# Patient Record
Sex: FEMALE | Race: WHITE
[De-identification: names, ages, dates, MRNs, and addresses within clinical notes are randomized per-mention and may not be internally consistent; named-entity substitution may affect disease eponyms.]

---

## 2019-11-25 ENCOUNTER — HOSPITAL ENCOUNTER (OUTPATIENT)
Dept: HOSPITAL 8 - OUT | Age: 39
Discharge: HOME | End: 2019-11-25
Attending: INTERNAL MEDICINE

## 2019-11-25 VITALS — BODY MASS INDEX: 40.56 KG/M2 | HEIGHT: 69 IN | WEIGHT: 273.81 LBS

## 2019-11-25 VITALS — SYSTOLIC BLOOD PRESSURE: 131 MMHG | DIASTOLIC BLOOD PRESSURE: 93 MMHG

## 2019-11-25 DIAGNOSIS — Z98.890: ICD-10-CM

## 2019-11-25 DIAGNOSIS — J45.909: ICD-10-CM

## 2019-11-25 DIAGNOSIS — Z79.01: ICD-10-CM

## 2019-11-25 DIAGNOSIS — R91.8: Primary | ICD-10-CM

## 2019-11-25 DIAGNOSIS — F15.90: ICD-10-CM

## 2019-11-25 DIAGNOSIS — G47.30: ICD-10-CM

## 2019-11-25 DIAGNOSIS — J20.9: ICD-10-CM

## 2019-11-25 LAB
ALBUMIN SERPL-MCNC: 3.3 G/DL (ref 3.4–5)
ALP SERPL-CCNC: 99 U/L (ref 45–117)
ALT SERPL-CCNC: 29 U/L (ref 12–78)
ANION GAP SERPL CALC-SCNC: 6 MMOL/L (ref 5–15)
APTT BLD: 29 SECONDS (ref 25–31)
BASOPHILS # BLD AUTO: 0.03 X10^3/UL (ref 0–0.1)
BASOPHILS NFR BLD AUTO: 1 % (ref 0–1)
BILIRUB SERPL-MCNC: 0.5 MG/DL (ref 0.2–1)
CALCIUM SERPL-MCNC: 8.9 MG/DL (ref 8.5–10.1)
CHLORIDE SERPL-SCNC: 107 MMOL/L (ref 98–107)
CREAT SERPL-MCNC: 0.87 MG/DL (ref 0.55–1.02)
EOSINOPHIL # BLD AUTO: 0.15 X10^3/UL (ref 0–0.4)
EOSINOPHIL NFR BLD AUTO: 2 % (ref 1–7)
ERYTHROCYTE [DISTWIDTH] IN BLOOD BY AUTOMATED COUNT: 14.1 % (ref 9.6–15.2)
HCG UR SG: 1.01 (ref 1–1.03)
INR PPP: 0.94 (ref 0.93–1.1)
LYMPHOCYTES # BLD AUTO: 1.44 X10^3/UL (ref 1–3.4)
LYMPHOCYTES NFR BLD AUTO: 22 % (ref 22–44)
MCH RBC QN AUTO: 26.7 PG (ref 27–34.8)
MCHC RBC AUTO-ENTMCNC: 33.4 G/DL (ref 32.4–35.8)
MCV RBC AUTO: 80 FL (ref 80–100)
MD: NO
MONOCYTES # BLD AUTO: 0.36 X10^3/UL (ref 0.2–0.8)
MONOCYTES NFR BLD AUTO: 6 % (ref 2–9)
NEUTROPHILS # BLD AUTO: 4.44 X10^3/UL (ref 1.8–6.8)
NEUTROPHILS NFR BLD AUTO: 69 % (ref 42–75)
PLATELET # BLD AUTO: 319 X10^3/UL (ref 130–400)
PMV BLD AUTO: 8.2 FL (ref 7.4–10.4)
PROT SERPL-MCNC: 7.6 G/DL (ref 6.4–8.2)
PROTHROMBIN TIME: 9.9 SECONDS (ref 9.6–11.5)
RBC # BLD AUTO: 4.63 X10^6/UL (ref 3.82–5.3)

## 2019-11-25 PROCEDURE — 88305 TISSUE EXAM BY PATHOLOGIST: CPT

## 2019-11-25 PROCEDURE — 85025 COMPLETE CBC W/AUTO DIFF WBC: CPT

## 2019-11-25 PROCEDURE — 99152 MOD SED SAME PHYS/QHP 5/>YRS: CPT

## 2019-11-25 PROCEDURE — 36415 COLL VENOUS BLD VENIPUNCTURE: CPT

## 2019-11-25 PROCEDURE — 85610 PROTHROMBIN TIME: CPT

## 2019-11-25 PROCEDURE — 81025 URINE PREGNANCY TEST: CPT

## 2019-11-25 PROCEDURE — 85730 THROMBOPLASTIN TIME PARTIAL: CPT

## 2019-11-25 PROCEDURE — 87070 CULTURE OTHR SPECIMN AEROBIC: CPT

## 2019-11-25 PROCEDURE — 88312 SPECIAL STAINS GROUP 1: CPT

## 2019-11-25 PROCEDURE — 87205 SMEAR GRAM STAIN: CPT

## 2019-11-25 PROCEDURE — 88112 CYTOPATH CELL ENHANCE TECH: CPT

## 2019-11-25 PROCEDURE — 87102 FUNGUS ISOLATION CULTURE: CPT

## 2019-11-25 PROCEDURE — 80053 COMPREHEN METABOLIC PANEL: CPT

## 2019-11-25 PROCEDURE — 87015 SPECIMEN INFECT AGNT CONCNTJ: CPT

## 2019-11-25 PROCEDURE — 31624 DX BRONCHOSCOPE/LAVAGE: CPT

## 2019-11-25 PROCEDURE — 87206 SMEAR FLUORESCENT/ACID STAI: CPT

## 2019-11-25 PROCEDURE — 99153 MOD SED SAME PHYS/QHP EA: CPT

## 2019-11-25 PROCEDURE — 87116 MYCOBACTERIA CULTURE: CPT

## 2019-11-25 PROCEDURE — 88108 CYTOPATH CONCENTRATE TECH: CPT

## 2019-12-16 ENCOUNTER — HOSPITAL ENCOUNTER (INPATIENT)
Dept: HOSPITAL 8 - ED | Age: 39
LOS: 2 days | Discharge: HOME | DRG: 167 | End: 2019-12-18
Attending: HOSPITALIST | Admitting: HOSPITALIST
Payer: COMMERCIAL

## 2019-12-16 VITALS — SYSTOLIC BLOOD PRESSURE: 114 MMHG | DIASTOLIC BLOOD PRESSURE: 73 MMHG

## 2019-12-16 VITALS — SYSTOLIC BLOOD PRESSURE: 122 MMHG | DIASTOLIC BLOOD PRESSURE: 66 MMHG

## 2019-12-16 VITALS — BODY MASS INDEX: 41.93 KG/M2 | WEIGHT: 283.07 LBS | HEIGHT: 69 IN

## 2019-12-16 DIAGNOSIS — Z83.3: ICD-10-CM

## 2019-12-16 DIAGNOSIS — K21.9: ICD-10-CM

## 2019-12-16 DIAGNOSIS — J45.909: ICD-10-CM

## 2019-12-16 DIAGNOSIS — R06.89: ICD-10-CM

## 2019-12-16 DIAGNOSIS — Z71.3: ICD-10-CM

## 2019-12-16 DIAGNOSIS — F32.9: ICD-10-CM

## 2019-12-16 DIAGNOSIS — E66.01: ICD-10-CM

## 2019-12-16 DIAGNOSIS — G47.30: ICD-10-CM

## 2019-12-16 DIAGNOSIS — I10: ICD-10-CM

## 2019-12-16 DIAGNOSIS — Z80.3: ICD-10-CM

## 2019-12-16 DIAGNOSIS — J18.9: Primary | ICD-10-CM

## 2019-12-16 DIAGNOSIS — Z79.52: ICD-10-CM

## 2019-12-16 LAB
<PLATELET ESTIMATE>: ADEQUATE
<PLT MORPHOLOGY>: (no result)
ALBUMIN SERPL-MCNC: 3.5 G/DL (ref 3.4–5)
ANION GAP SERPL CALC-SCNC: 6 MMOL/L (ref 5–15)
BAND#(MANUAL): 0.22 X10^3/UL
BILIRUB DIRECT SERPL-MCNC: NORMAL MG/DL
CALCIUM SERPL-MCNC: 9 MG/DL (ref 8.5–10.1)
CHLORIDE SERPL-SCNC: 106 MMOL/L (ref 98–107)
CREAT SERPL-MCNC: 0.91 MG/DL (ref 0.55–1.02)
ERYTHROCYTE [DISTWIDTH] IN BLOOD BY AUTOMATED COUNT: 15.2 % (ref 9.6–15.2)
ERYTHROCYTE [SEDIMENTATION RATE] IN BLOOD BY WESTERGREN METHOD: 28 MM/HR (ref 0–20)
HCT (SEDRATE): 41.3 % (ref 34.6–47.8)
LYMPH#(MANUAL): 3.23 X10^3/UL (ref 1–3.4)
LYMPHS% (MANUAL): 15 % (ref 22–44)
MCH RBC QN AUTO: 26.7 PG (ref 27–34.8)
MCHC RBC AUTO-ENTMCNC: 32.7 G/DL (ref 32.4–35.8)
MCV RBC AUTO: 81.4 FL (ref 80–100)
MD: YES
MONOS#(MANUAL): 0.65 X10^3/UL (ref 0.3–2.7)
MONOS% (MANUAL): 3 % (ref 2–9)
NEUTS BAND NFR BLD: 1 % (ref 0–7)
PLATELET # BLD AUTO: 435 X10^3/UL (ref 130–400)
PMV BLD AUTO: 8.2 FL (ref 7.4–10.4)
RBC # BLD AUTO: 5.07 X10^6/UL (ref 3.82–5.3)
SEG#(MANUAL): 17.42 X10^3/UL (ref 1.8–6.8)
SEGS% (MANUAL): 81 % (ref 42–75)

## 2019-12-16 PROCEDURE — C1729 CATH, DRAINAGE: HCPCS

## 2019-12-16 PROCEDURE — 0399T: CPT

## 2019-12-16 PROCEDURE — C1760 CLOSURE DEV, VASC: HCPCS

## 2019-12-16 PROCEDURE — 71045 X-RAY EXAM CHEST 1 VIEW: CPT

## 2019-12-16 PROCEDURE — 85025 COMPLETE CBC W/AUTO DIFF WBC: CPT

## 2019-12-16 PROCEDURE — 87075 CULTR BACTERIA EXCEPT BLOOD: CPT

## 2019-12-16 PROCEDURE — 87102 FUNGUS ISOLATION CULTURE: CPT

## 2019-12-16 PROCEDURE — 82040 ASSAY OF SERUM ALBUMIN: CPT

## 2019-12-16 PROCEDURE — 86140 C-REACTIVE PROTEIN: CPT

## 2019-12-16 PROCEDURE — 86256 FLUORESCENT ANTIBODY TITER: CPT

## 2019-12-16 PROCEDURE — 82785 ASSAY OF IGE: CPT

## 2019-12-16 PROCEDURE — 84145 PROCALCITONIN (PCT): CPT

## 2019-12-16 PROCEDURE — 80048 BASIC METABOLIC PNL TOTAL CA: CPT

## 2019-12-16 PROCEDURE — 82784 ASSAY IGA/IGD/IGG/IGM EACH: CPT

## 2019-12-16 PROCEDURE — 99285 EMERGENCY DEPT VISIT HI MDM: CPT

## 2019-12-16 PROCEDURE — 93005 ELECTROCARDIOGRAM TRACING: CPT

## 2019-12-16 PROCEDURE — 87070 CULTURE OTHR SPECIMN AEROBIC: CPT

## 2019-12-16 PROCEDURE — 87116 MYCOBACTERIA CULTURE: CPT

## 2019-12-16 PROCEDURE — 83880 ASSAY OF NATRIURETIC PEPTIDE: CPT

## 2019-12-16 PROCEDURE — 83605 ASSAY OF LACTIC ACID: CPT

## 2019-12-16 PROCEDURE — 86038 ANTINUCLEAR ANTIBODIES: CPT

## 2019-12-16 PROCEDURE — 93306 TTE W/DOPPLER COMPLETE: CPT

## 2019-12-16 PROCEDURE — 83520 IMMUNOASSAY QUANT NOS NONAB: CPT

## 2019-12-16 PROCEDURE — 87206 SMEAR FLUORESCENT/ACID STAI: CPT

## 2019-12-16 PROCEDURE — 87015 SPECIMEN INFECT AGNT CONCNTJ: CPT

## 2019-12-16 PROCEDURE — 85651 RBC SED RATE NONAUTOMATED: CPT

## 2019-12-16 PROCEDURE — 87040 BLOOD CULTURE FOR BACTERIA: CPT

## 2019-12-16 PROCEDURE — 71260 CT THORAX DX C+: CPT

## 2019-12-16 PROCEDURE — 84703 CHORIONIC GONADOTROPIN ASSAY: CPT

## 2019-12-16 PROCEDURE — 88307 TISSUE EXAM BY PATHOLOGIST: CPT

## 2019-12-16 PROCEDURE — 71046 X-RAY EXAM CHEST 2 VIEWS: CPT

## 2019-12-16 PROCEDURE — 87205 SMEAR GRAM STAIN: CPT

## 2019-12-16 PROCEDURE — 36415 COLL VENOUS BLD VENIPUNCTURE: CPT

## 2019-12-16 RX ADMIN — VENLAFAXINE HYDROCHLORIDE SCH MG: 37.5 CAPSULE, EXTENDED RELEASE ORAL at 20:10

## 2019-12-16 RX ADMIN — TOPIRAMATE SCH MG: 25 TABLET, FILM COATED ORAL at 20:10

## 2019-12-16 RX ADMIN — PANTOPRAZOLE SODIUM SCH MG: 40 TABLET, DELAYED RELEASE ORAL at 20:10

## 2019-12-16 RX ADMIN — AZITHROMYCIN FOR INJECTION INJECTION, POWDER, LYOPHILIZED, FOR SOLUTION SCH MLS/HR: 500 INJECTION INTRAVENOUS at 16:40

## 2019-12-16 RX ADMIN — BUDESONIDE AND FORMOTEROL FUMARATE DIHYDRATE SCH PUFF: 160; 4.5 AEROSOL RESPIRATORY (INHALATION) at 20:44

## 2019-12-16 RX ADMIN — ENOXAPARIN SODIUM SCH MG: 40 INJECTION SUBCUTANEOUS at 20:09

## 2019-12-16 RX ADMIN — MONTELUKAST SODIUM SCH MG: 10 TABLET ORAL at 20:10

## 2019-12-16 NOTE — NUR
PT SITTING UP IN BED, RESPIRATIONS EVEN AND UNLABORED SATURATING WELL ON RA. 
FREQUENT DRY COUGH, WEAK/HOARSE VOICE. PT REPORT SOB AND COUGH X7 MO WITH NO 
RESOLVE VIA PREDNISONE. SIDE RAIL UP, CALL LIGHT IN REACH. LABS DRAWN, BP AND 
SPO2 MONITORING IN PLACE. EKG COMPLETED. AWAITING [facility].

## 2019-12-16 NOTE — NUR
PT SITTING UP IN BED, RESPIRATIONS EVEN AND UNLABORED ON RA. OCCASSIONAL COUGH. 
IV START FOR CT SCAN. NAD NOTED AT THIS TIME. FRIEND AT BEDSIDE. CALL LIGHT IN 
REACH.

## 2019-12-16 NOTE — NUR
pt returned from ct, using cell phone in bed. NAD noted at this time. Friend at 
bedside. Awaiting results.

## 2019-12-16 NOTE — NUR
REPORT TO SUZY MARTINEZ. NAD NOTED IN PT AT THIS TIME. RESPIRATIONS EVEN AND 
UNLABORED, FREQUENT COUGH. AWAITING TRANSPORT UPSTAIRS. PT AWARE OF NEED FOR 
CPAP.

## 2019-12-17 VITALS — SYSTOLIC BLOOD PRESSURE: 118 MMHG | DIASTOLIC BLOOD PRESSURE: 74 MMHG

## 2019-12-17 VITALS — SYSTOLIC BLOOD PRESSURE: 122 MMHG | DIASTOLIC BLOOD PRESSURE: 81 MMHG

## 2019-12-17 VITALS — SYSTOLIC BLOOD PRESSURE: 133 MMHG | DIASTOLIC BLOOD PRESSURE: 86 MMHG

## 2019-12-17 VITALS — SYSTOLIC BLOOD PRESSURE: 122 MMHG | DIASTOLIC BLOOD PRESSURE: 79 MMHG

## 2019-12-17 LAB
ANA SER QL: (no result)
ANION GAP SERPL CALC-SCNC: 6 MMOL/L (ref 5–15)
BASOPHILS # BLD AUTO: 0.01 X10^3/UL (ref 0–0.1)
BASOPHILS NFR BLD AUTO: 0 % (ref 0–1)
CALCIUM SERPL-MCNC: 9 MG/DL (ref 8.5–10.1)
CHLORIDE SERPL-SCNC: 108 MMOL/L (ref 98–107)
CREAT SERPL-MCNC: 0.91 MG/DL (ref 0.55–1.02)
EOSINOPHIL # BLD AUTO: 0.2 X10^3/UL (ref 0–0.4)
EOSINOPHIL NFR BLD AUTO: 2 % (ref 1–7)
ERYTHROCYTE [DISTWIDTH] IN BLOOD BY AUTOMATED COUNT: 14.9 % (ref 9.6–15.2)
LYMPHOCYTES # BLD AUTO: 1.31 X10^3/UL (ref 1–3.4)
LYMPHOCYTES NFR BLD AUTO: 10 % (ref 22–44)
MCH RBC QN AUTO: 26.6 PG (ref 27–34.8)
MCHC RBC AUTO-ENTMCNC: 32.6 G/DL (ref 32.4–35.8)
MCV RBC AUTO: 81.8 FL (ref 80–100)
MD: NO
MONOCYTES # BLD AUTO: 0.23 X10^3/UL (ref 0.2–0.8)
MONOCYTES NFR BLD AUTO: 2 % (ref 2–9)
NEUTROPHILS # BLD AUTO: 11.7 X10^3/UL (ref 1.8–6.8)
NEUTROPHILS NFR BLD AUTO: 87 % (ref 42–75)
PLATELET # BLD AUTO: 386 X10^3/UL (ref 130–400)
PMV BLD AUTO: 8.8 FL (ref 7.4–10.4)
RBC # BLD AUTO: 4.76 X10^6/UL (ref 3.82–5.3)

## 2019-12-17 PROCEDURE — 0BBJ4ZX EXCISION OF LEFT LOWER LUNG LOBE, PERCUTANEOUS ENDOSCOPIC APPROACH, DIAGNOSTIC: ICD-10-PCS | Performed by: SURGERY

## 2019-12-17 PROCEDURE — 0BBG4ZX EXCISION OF LEFT UPPER LUNG LOBE, PERCUTANEOUS ENDOSCOPIC APPROACH, DIAGNOSTIC: ICD-10-PCS | Performed by: SURGERY

## 2019-12-17 RX ADMIN — BUDESONIDE AND FORMOTEROL FUMARATE DIHYDRATE SCH PUFF: 160; 4.5 AEROSOL RESPIRATORY (INHALATION) at 08:28

## 2019-12-17 RX ADMIN — MONTELUKAST SODIUM SCH MG: 10 TABLET ORAL at 11:03

## 2019-12-17 RX ADMIN — ACETAMINOPHEN SCH MG: 500 TABLET, COATED ORAL at 23:29

## 2019-12-17 RX ADMIN — TOPIRAMATE SCH MG: 25 TABLET, FILM COATED ORAL at 23:29

## 2019-12-17 RX ADMIN — VENLAFAXINE HYDROCHLORIDE SCH MG: 37.5 CAPSULE, EXTENDED RELEASE ORAL at 23:28

## 2019-12-17 RX ADMIN — AZITHROMYCIN FOR INJECTION INJECTION, POWDER, LYOPHILIZED, FOR SOLUTION SCH MLS/HR: 500 INJECTION INTRAVENOUS at 16:43

## 2019-12-17 RX ADMIN — OXYCODONE HYDROCHLORIDE PRN MG: 5 TABLET ORAL at 23:29

## 2019-12-17 RX ADMIN — ENOXAPARIN SODIUM SCH MG: 40 INJECTION SUBCUTANEOUS at 20:00

## 2019-12-17 RX ADMIN — BUDESONIDE AND FORMOTEROL FUMARATE DIHYDRATE SCH PUFF: 160; 4.5 AEROSOL RESPIRATORY (INHALATION) at 21:00

## 2019-12-17 RX ADMIN — PANTOPRAZOLE SODIUM SCH MG: 40 TABLET, DELAYED RELEASE ORAL at 11:03

## 2019-12-17 RX ADMIN — CEFTRIAXONE SCH MLS/HR: 1 INJECTION, SOLUTION INTRAVENOUS at 11:45

## 2019-12-18 VITALS — DIASTOLIC BLOOD PRESSURE: 77 MMHG | SYSTOLIC BLOOD PRESSURE: 126 MMHG

## 2019-12-18 VITALS — DIASTOLIC BLOOD PRESSURE: 88 MMHG | SYSTOLIC BLOOD PRESSURE: 130 MMHG

## 2019-12-18 VITALS — DIASTOLIC BLOOD PRESSURE: 82 MMHG | SYSTOLIC BLOOD PRESSURE: 125 MMHG

## 2019-12-18 VITALS — DIASTOLIC BLOOD PRESSURE: 74 MMHG | SYSTOLIC BLOOD PRESSURE: 133 MMHG

## 2019-12-18 RX ADMIN — IBUPROFEN SCH MG: 800 TABLET ORAL at 09:00

## 2019-12-18 RX ADMIN — CEFTRIAXONE SCH MLS/HR: 1 INJECTION, SOLUTION INTRAVENOUS at 12:12

## 2019-12-18 RX ADMIN — OXYCODONE HYDROCHLORIDE PRN MG: 5 TABLET ORAL at 05:43

## 2019-12-18 RX ADMIN — PANTOPRAZOLE SODIUM SCH MG: 40 TABLET, DELAYED RELEASE ORAL at 12:13

## 2019-12-18 RX ADMIN — MONTELUKAST SODIUM SCH MG: 10 TABLET ORAL at 12:13

## 2019-12-18 RX ADMIN — ACETAMINOPHEN SCH MG: 500 TABLET, COATED ORAL at 12:12

## 2019-12-18 RX ADMIN — IBUPROFEN SCH MG: 800 TABLET ORAL at 12:00

## 2019-12-18 RX ADMIN — ACETAMINOPHEN SCH MG: 500 TABLET, COATED ORAL at 05:42

## 2019-12-18 RX ADMIN — BUDESONIDE AND FORMOTEROL FUMARATE DIHYDRATE SCH PUFF: 160; 4.5 AEROSOL RESPIRATORY (INHALATION) at 09:00

## 2020-05-08 ENCOUNTER — HOSPITAL ENCOUNTER (OUTPATIENT)
Dept: HOSPITAL 8 - CFH | Age: 40
Discharge: HOME | End: 2020-05-08
Attending: GENERAL PRACTICE
Payer: COMMERCIAL

## 2020-05-08 DIAGNOSIS — R91.8: ICD-10-CM

## 2020-05-08 DIAGNOSIS — I51.7: ICD-10-CM

## 2020-05-08 DIAGNOSIS — J84.9: Primary | ICD-10-CM

## 2020-05-08 PROCEDURE — 71250 CT THORAX DX C-: CPT

## 2020-12-02 ASSESSMENT — ENCOUNTER SYMPTOMS
SHORTNESS OF BREATH: 1
HEMOPTYSIS: 0
CHEST TIGHTNESS: 1
RESPIRATORY SYMPTOMS COMMENTS: SOMETIMES
WHEEZING: 1
DYSPNEA AT REST: 1

## 2020-12-04 ENCOUNTER — OFFICE VISIT (OUTPATIENT)
Dept: SLEEP MEDICINE | Facility: MEDICAL CENTER | Age: 40
End: 2020-12-04
Payer: COMMERCIAL

## 2020-12-04 VITALS
OXYGEN SATURATION: 94 % | SYSTOLIC BLOOD PRESSURE: 108 MMHG | WEIGHT: 293 LBS | DIASTOLIC BLOOD PRESSURE: 72 MMHG | HEIGHT: 68 IN | HEART RATE: 82 BPM | BODY MASS INDEX: 44.41 KG/M2

## 2020-12-04 DIAGNOSIS — E66.01 CLASS 3 SEVERE OBESITY WITH SERIOUS COMORBIDITY AND BODY MASS INDEX (BMI) OF 45.0 TO 49.9 IN ADULT, UNSPECIFIED OBESITY TYPE (HCC): ICD-10-CM

## 2020-12-04 DIAGNOSIS — D63.8 ANEMIA OF CHRONIC DISEASE: ICD-10-CM

## 2020-12-04 DIAGNOSIS — J84.9 INTERSTITIAL LUNG DISEASE (HCC): ICD-10-CM

## 2020-12-04 DIAGNOSIS — G47.33 OSA ON CPAP: ICD-10-CM

## 2020-12-04 PROCEDURE — 99358 PROLONG SERVICE W/O CONTACT: CPT | Performed by: INTERNAL MEDICINE

## 2020-12-04 PROCEDURE — 99205 OFFICE O/P NEW HI 60 MIN: CPT | Performed by: INTERNAL MEDICINE

## 2020-12-04 RX ORDER — HYDROCODONE BITARTRATE AND ACETAMINOPHEN 7.5; 325 MG/1; MG/1
2 TABLET ORAL EVERY 6 HOURS PRN
COMMUNITY
Start: 2020-10-30

## 2020-12-04 RX ORDER — HYDROCHLOROTHIAZIDE 25 MG/1
25 TABLET ORAL DAILY
COMMUNITY
Start: 2020-11-25

## 2020-12-04 RX ORDER — IBUPROFEN 800 MG/1
800 TABLET ORAL 3 TIMES DAILY PRN
COMMUNITY
Start: 2020-11-11

## 2020-12-04 RX ORDER — METHOCARBAMOL 750 MG/1
750 TABLET, FILM COATED ORAL 3 TIMES DAILY PRN
COMMUNITY
Start: 2020-10-30

## 2020-12-04 RX ORDER — BUDESONIDE AND FORMOTEROL FUMARATE DIHYDRATE 80; 4.5 UG/1; UG/1
2 AEROSOL RESPIRATORY (INHALATION) 2 TIMES DAILY
COMMUNITY
Start: 2020-07-24 | End: 2021-06-16

## 2020-12-04 RX ORDER — VENLAFAXINE HYDROCHLORIDE 37.5 MG/1
75 CAPSULE, EXTENDED RELEASE ORAL DAILY
COMMUNITY
Start: 2020-11-24

## 2020-12-04 RX ORDER — PREDNISONE 2.5 MG/1
8 TABLET ORAL DAILY
COMMUNITY
Start: 2020-10-13

## 2020-12-04 RX ORDER — MYCOPHENOLATE MOFETIL 500 MG/1
1250 TABLET ORAL 2 TIMES DAILY
COMMUNITY
Start: 2020-11-11

## 2020-12-04 RX ORDER — TOPIRAMATE 25 MG/1
25 TABLET ORAL DAILY
COMMUNITY
Start: 2020-11-11 | End: 2021-06-16

## 2020-12-04 RX ORDER — OMEPRAZOLE 20 MG/1
20 CAPSULE, DELAYED RELEASE ORAL 2 TIMES DAILY
COMMUNITY
Start: 2020-11-24 | End: 2021-06-16 | Stop reason: SDUPTHER

## 2020-12-04 RX ORDER — MONTELUKAST SODIUM 10 MG/1
10 TABLET ORAL DAILY
COMMUNITY
Start: 2020-11-24 | End: 2021-06-16 | Stop reason: SDUPTHER

## 2020-12-04 RX ORDER — BENZONATATE 100 MG/1
100 CAPSULE ORAL 2 TIMES DAILY PRN
COMMUNITY
Start: 2020-09-29 | End: 2022-09-08 | Stop reason: SDUPTHER

## 2020-12-04 ASSESSMENT — ENCOUNTER SYMPTOMS
SHORTNESS OF BREATH: 1
WHEEZING: 1
HEMOPTYSIS: 0
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
## 2020-12-05 NOTE — ASSESSMENT & PLAN NOTE
Extensive prior work-up for interstitial lung disease at [facility] at [facility] including surgical lung biopsy in 2019, working diagnosis of NSIP/OP related to underlying undifferentiated connective tissue disease.  SSA positive, however lip bx negative for Sjogren's findings    No O2  mMRC 2/ NYHA II.    Plan:  - Sees [facility] pulmonologist Dr. Short 12/2020.  PFTs/6MWT ordered prior to appointment.  - Continue MMF 2 g/day, prednisone 7.5 mg p.o. daily.   - Although I think she would greatly benefit, pulmonary rehab is unfortunately not realistic as she has a full-time job Monday through Friday and has exhausted her vacation/sick leave.  - Requested recommendations for alternative rheumatologist, referral placed  - PCV13: 6/2020; influenza 9/2020

## 2020-12-05 NOTE — ASSESSMENT & PLAN NOTE
Counseled about the need for weight loss for long-term health risk reduction  If lung transplant is a necessity in the future will also need to get weight down

## 2020-12-05 NOTE — ASSESSMENT & PLAN NOTE
Labs 11/2020: chronic microcytic anemia, iron saturation 9%, iron and ferritin lower limit of normal.  TIBC, B12 and folate WNL.    -Overall pattern suggestive of combination anemia of chronic disease and SYMONE  -start iron supplementation OTC ferrous sulfate

## 2020-12-05 NOTE — ASSESSMENT & PLAN NOTE
Referral placed to establish care, previously followed at [facility]  Currently APAP 5-15 cmH2O

## 2020-12-07 ENCOUNTER — HOSPITAL ENCOUNTER (OUTPATIENT)
Dept: RADIOLOGY | Facility: MEDICAL CENTER | Age: 40
End: 2020-12-07
Payer: COMMERCIAL

## 2020-12-17 ENCOUNTER — APPOINTMENT (OUTPATIENT)
Dept: SLEEP MEDICINE | Facility: MEDICAL CENTER | Age: 40
End: 2020-12-17
Payer: COMMERCIAL

## 2021-01-21 ENCOUNTER — NON-PROVIDER VISIT (OUTPATIENT)
Dept: SLEEP MEDICINE | Facility: MEDICAL CENTER | Age: 41
End: 2021-01-21
Payer: COMMERCIAL

## 2021-01-21 ENCOUNTER — NON-PROVIDER VISIT (OUTPATIENT)
Dept: SLEEP MEDICINE | Facility: MEDICAL CENTER | Age: 41
End: 2021-01-21
Attending: INTERNAL MEDICINE
Payer: COMMERCIAL

## 2021-01-21 VITALS — WEIGHT: 293 LBS | BODY MASS INDEX: 44.41 KG/M2 | HEIGHT: 68 IN

## 2021-01-21 VITALS — BODY MASS INDEX: 44.41 KG/M2 | WEIGHT: 293 LBS | HEIGHT: 68 IN

## 2021-01-21 DIAGNOSIS — J84.9 INTERSTITIAL LUNG DISEASE (HCC): ICD-10-CM

## 2021-01-21 PROCEDURE — 94729 DIFFUSING CAPACITY: CPT | Performed by: INTERNAL MEDICINE

## 2021-01-21 PROCEDURE — 94618 PULMONARY STRESS TESTING: CPT | Performed by: INTERNAL MEDICINE

## 2021-01-21 PROCEDURE — 94060 EVALUATION OF WHEEZING: CPT | Performed by: INTERNAL MEDICINE

## 2021-01-21 PROCEDURE — 94726 PLETHYSMOGRAPHY LUNG VOLUMES: CPT | Performed by: INTERNAL MEDICINE

## 2021-01-21 ASSESSMENT — 6 MINUTE WALK TEST (6MWT)
PERCEIVED BREATHLESSNESS AT 6 MIN: 2
PERCEIVED FATIGUE AT 6 MIN: 4
HEART RATE AT 1 MIN: 110
SITTING BLOOD PRESSURE: 140/92
SAO2 AT 4 MIN: 88
PERCEIVED FATIGUE AT 2 MIN: 4
SAO2 AT 6 MIN: 88
BLOOD PRESSURE: LEFT ARM
SAO2 AT 1 MIN: 92
HEART RATE AT 4 MIN: 148
PERCEIVED FATIGUE AT 4 MIN: 4
HEART RATE AT 2 MIN: 100
PERCEIVED FATIGUE AT 3 MIN: 4
PERCEIVED BREATHLESSNESS AT 1 MIN: 0.5
PERCENT OF NORMAL WALKED: 83
BLOOD PRESSURE AT 1 MIN: 140/92
HEART RATE AT 2 MIN: 134
PERCEIVED BREATHLESSNESS AT 1 MIN: 2
HEART RATE AT 3 MIN: 136
HEART RATE AT 5 MIN: 152
HEART RATE: 84
HEART RATE AT 1 MIN: 136
SAO2 AT 5 MIN: 88
PERCEIVED BREATHLESSNESS AT 3 MIN: 0.5
NUMBER OF RESTS: 0
PERCEIVED FATIGUE AT 5 MIN: 4
SAO2 AT 1 MIN: 94
TOTAL REST TIME: 0
PERCEIVED FATIGUE AT 1 MIN: 4
HEART RATE AT 6 MIN: 159
PERCEIVED BREATHLESSNESS AT 5 MIN: 1
SAO2 AT 3 MIN: 90
SAO2 AT 2 MIN: 89
SAO2 AT 2 MIN: 96
PERCEIVED BREATHLESSNESS AT 2 MIN: 2
AMBULATES WITH O2: WITHOUT O2
O2 SAT PERCENT ROOM AIR: 97
PERCEIVED FATIGUE AT 1 MIN: 4
PERCEIVED BREATHLESSNESS AT 4 MIN: 1
PERCEIVED BREATHLESSNESS AT 2 MIN: 0.5
PERCEIVED FATIGUE AT 2 MIN: 4

## 2021-01-21 ASSESSMENT — PULMONARY FUNCTION TESTS
FVC_PREDICTED: 4.24
FEV1/FVC_PERCENT_PREDICTED: 82
FEV1_PERCENT_PREDICTED: 49
FVC_PERCENT_PREDICTED: 76
FEV1/FVC: 60
FEV1/FVC_PERCENT_PREDICTED: 73
FEV1_LLN: 2.89
FEV1/FVC_PERCENT_CHANGE: 22
FEV1_PERCENT_CHANGE: 38
FVC_LLN: 3.54
FEV1/FVC_PERCENT_PREDICTED: 89
FEV1/FVC_PERCENT_LLN: 69
FEV1/FVC: 73
FEV1_PERCENT_CHANGE: 13
FEV1/FVC: 60
FVC_PERCENT_PREDICTED: 67
FEV1/FVC_PERCENT_PREDICTED: 89
FEV1/FVC_PREDICTED: 82
FEV1: 2.38
FEV1_PERCENT_PREDICTED: 68
FEV1_PREDICTED: 3.46
FEV1/FVC_PERCENT_PREDICTED: 73
FEV1/FVC: 73.46
FVC: 2.85
FEV1/FVC_PERCENT_CHANGE: 292
FEV1: 1.72
FVC: 3.24

## 2021-01-21 NOTE — PROCEDURES
Technician: ANA Pinzon    Technician Comment:  Good patient effort & cooperation.  The results of this test meet the ATS/ERS standards for acceptability & reproducibility.  Test was performed on the Real Time Content Body Plethysmograph-Elite DX system.  Predicted values were GLI- for spirometry, GLI- for DLCO, ITS for Lung Volumes.  The DLCO was uncorrected for Hgb.  A bronchodilator of Ventolin HFA -2puffs via spacer administered.  DLCO performed during dilation period.    FVC of 3.24 L or 76%, FEV1 of 2.38 L or 68%, FEV1/FVC: 73%.  Significant reversibility noted.  T%.  DLCO: 102%.    Interpretation:  Moderately severe obstructive ventilatory defect, with significant bronchodilator response.  The best FEV1 is 2.38 L or 68%.  Normal total lung capacity.  Normal diffusion capacity.

## 2021-01-21 NOTE — PROCEDURES
Test on Room Air. Patient has pain in her back.    Interpretation;  There is significant desaturation with ambulation from a saturation of 97% at rest on room air to a malcom of 88% at 4 minutes.  Patient was able to maintain a saturation of 88% without dropping lower and therefore was not placed on supplemental oxygen for the test.  Distance walked was 1320 feet or 83% predicted.  This test does show abnormal desaturation with ambulation.  Patient may benefit from supplemental oxygen with activity.  Exercise tolerance is good.

## 2021-01-27 ENCOUNTER — PATIENT MESSAGE (OUTPATIENT)
Dept: SLEEP MEDICINE | Facility: MEDICAL CENTER | Age: 41
End: 2021-01-27

## 2021-02-26 ENCOUNTER — OFFICE VISIT (OUTPATIENT)
Dept: SLEEP MEDICINE | Facility: MEDICAL CENTER | Age: 41
End: 2021-02-26
Payer: COMMERCIAL

## 2021-02-26 VITALS
HEART RATE: 92 BPM | BODY MASS INDEX: 44.41 KG/M2 | HEIGHT: 68 IN | OXYGEN SATURATION: 93 % | WEIGHT: 293 LBS | DIASTOLIC BLOOD PRESSURE: 72 MMHG | SYSTOLIC BLOOD PRESSURE: 118 MMHG

## 2021-02-26 DIAGNOSIS — G47.33 OSA ON CPAP: ICD-10-CM

## 2021-02-26 DIAGNOSIS — J84.9 INTERSTITIAL LUNG DISEASE (HCC): ICD-10-CM

## 2021-02-26 DIAGNOSIS — E66.01 CLASS 3 SEVERE OBESITY WITH SERIOUS COMORBIDITY AND BODY MASS INDEX (BMI) OF 45.0 TO 49.9 IN ADULT, UNSPECIFIED OBESITY TYPE (HCC): ICD-10-CM

## 2021-02-26 DIAGNOSIS — D63.8 ANEMIA OF CHRONIC DISEASE: ICD-10-CM

## 2021-02-26 PROCEDURE — 99214 OFFICE O/P EST MOD 30 MIN: CPT | Performed by: INTERNAL MEDICINE

## 2021-02-26 PROCEDURE — 94761 N-INVAS EAR/PLS OXIMETRY MLT: CPT | Performed by: INTERNAL MEDICINE

## 2021-02-26 ASSESSMENT — ENCOUNTER SYMPTOMS
SINUS PAIN: 0
DIARRHEA: 0
WEIGHT LOSS: 0
VOMITING: 0
EYE DISCHARGE: 0
EYE PAIN: 0
WHEEZING: 1
FOCAL WEAKNESS: 0
LOSS OF CONSCIOUSNESS: 0
FEVER: 0
SORE THROAT: 0
NAUSEA: 0
MYALGIAS: 0
SHORTNESS OF BREATH: 1
ORTHOPNEA: 0
STRIDOR: 0
PSYCHIATRIC NEGATIVE: 1
HEMOPTYSIS: 0
SENSORY CHANGE: 0
DIZZINESS: 0
CHILLS: 0
HEADACHES: 0
SPUTUM PRODUCTION: 0
COUGH: 0
ABDOMINAL PAIN: 0

## 2021-02-26 NOTE — PROCEDURES
Multi-Ox Readings  Multi Ox #1 Room air   O2 sat % at rest 96(Pulse: 87)   O2 sat % on exertion 90(Pulse: 100)   O2 sat average on exertion     Multi Ox #2     O2 sat % at rest     O2 sat % on exertion     O2 sat average on exertion       Oxygen Use     Oxygen Frequency     Duration of need     Is the patient mobile within the home?     CPAP Use? 5-15   BIPAP Use?     Servo Titration

## 2021-02-26 NOTE — PROGRESS NOTES
Pulmonary Clinic Note    Chief Complaint:  Chief Complaint   Patient presents with   • Follow-Up     ILD. Last seen 12/04/20   • Results     PFT/6MW 01/21/21, [facility] 02/10/21     HPI:   Lara Alexander is a very pleasant 40 y.o. female never tobacco smoker who returns to the pulmonary clinic for follow-up of interstitial lung disease.  To the pulmonary clinic for interstitial lung disease.   Initial consultation in 12/2020, previously under the care of pulmonologist Dr Plasencia at [place].     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  Her symptoms began in 2015 when she developed an allergic reaction for which she was seen by an allergist.  She underwent skin testing which was negative and was diagnosed with asthma and treated with inhalers with no symptomatic improvement.  She had a dry cough for about 3 years which was stable, then about 2 years ago the cough became more vigorous and refractory and would be so severe that it would bring her to the point of gagging and throwing up.  Cough is minimally productive primarily of clear phlegm.  She established care with Dr. Plasencia and underwent a surgical lung biopsy in 2019, reportedly showing a pattern suggestive of UIP with fibroblastic foci and eosinophilia, however records also report uniform cellular expansion of interstitium suggestive of NSIP/OP.  She subsequently underwent an extensive serologic evaluation and was referred to [facility] pulmonologist Dr. Ji Short, as well as establish care with rheumatologist Dr. Yang here in [place].  Multidisciplinary discussion at [facility] concluded diagnosis of undifferentiated CTD-related ILD, based on a positive SSA but subsequent negative lip biopsy for Sjogrens.     CT chest 5/2020: multiple small nodular areas of consolidation and GGO in random distribution throughout both lungs with no honeycombing cystic changes of bronchiectasis.  No mediastinal or hilar  lymphadenopathy.  No interval change since 12/2019.     PFTs   8/2020: FVC 2.90 L, 65% predicted, FEV1 2.27 L, 63% predicted, ratio 78%, TLC 4.11 L, 70% predicted, DLCO 80% predicted  1/2021: FVC 2.85 L, 67% predicted, FEV1 1.72 L, 49% predicted, ratio 60%, TLC 4.82 L, 85% predicted, DLCO 102% predicted    6MWT:   1/2021: 1320 ft with desaturations to 88%.  1400 feet without desaturations.     She does not require supplemental oxygen during the day.  She has obstructive sleep apnea for which she is on APAP 5-15.      Interval events since last visit 12/2020:  [facility] records reviewed, last seen 1/2021    Working diagnosis organizing pneumonia/unclassified ILD, possible NSIP associated with Sjogren's syndrome.  mycophenolate planned to be increased to 2.5 g/day, prednisone 7.5  Also pending starting Symbicort 160  PFTs 1/2021 show persistent obstruction, drop in FEV1 but improvement in TLC.  Has gained 6 pounds since last visit, BMI now 47    MMRC Grade: 2: Walks slower than friends due to breathlessness, has to stop at own pace  NYHA Class: II. Mild symptoms with normal physical activity and comfortable at rest.  She is short of breath after about 1/4 mile and with heavy housework, however her primary complaint is the cough. 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    Past Medical History:   Diagnosis Date   • Asthma    • Back pain    • Back pain    • Chest pain    • Chest tightness    • Chickenpox    • Chills    • Cough    • Depression    • Earache    • Fatigue    • Frequent headaches    • Frequent urination    • GERD (gastroesophageal reflux disease)    • Heartburn    • Hyperlipidemia    • Hypertension    • Hypothyroidism    • Insomnia    • Irregular periods    • Morning headache    • Nasal drainage    • Nausea    • Obesity    • Painful joint    • Pneumonia    • Restless leg syndrome    • Rhinitis    • Shortness of breath    • Sleep apnea    • Sore  muscles    • Sputum production    • Sweat, sweating, excessive    • Swelling of lower extremity    • Tonsillitis    • Weakness    • Wears glasses    • Wheezing        Past Surgical History:   Procedure Laterality Date   • BRONCHOSCOPY     • LUNG BIOPSY OPEN     • TONSILLECTOMY         Social History     Socioeconomic History   • Marital status: Single     Spouse name: Not on file   • Number of children: Not on file   • Years of education: Not on file   • Highest education level: Not on file   Occupational History   • Not on file   Tobacco Use   • Smoking status: Never Smoker   • Smokeless tobacco: Never Used   Substance and Sexual Activity   • Alcohol use: Not Currently     Comment: Maybe have a drink or two a year   • Drug use: No   • Sexual activity: Not on file   Other Topics Concern   • Not on file   Social History Narrative   • Not on file     Social Determinants of Health     Financial Resource Strain:    • Difficulty of Paying Living Expenses:    Food Insecurity:    • Worried About Running Out of Food in the Last Year:    • Ran Out of Food in the Last Year:    Transportation Needs:    • Lack of Transportation (Medical):    • Lack of Transportation (Non-Medical):    Physical Activity:    • Days of Exercise per Week:    • Minutes of Exercise per Session:    Stress:    • Feeling of Stress :    Social Connections:    • Frequency of Communication with Friends and Family:    • Frequency of Social Gatherings with Friends and Family:    • Attends [employer]ian[employer] Services:    • Active Member of Clubs or Organizations:    • Attends Club or Organization Meetings:    • Marital Status:    Intimate Partner Violence:    • Fear of Current or Ex-Partner:    • Emotionally Abused:    • Physically Abused:    • Sexually Abused:           Family History   Problem Relation Age of Onset   • Cancer Maternal Grandfather    • Breast Cancer Paternal Grandmother    • Stroke Paternal Grandfather        Current Outpatient Medications on File  Prior to Visit   Medication Sig Dispense Refill   • Ferrous Sulfate (IRON PO) Take  by mouth every 48 hours.     • venlafaxine XR (EFFEXOR XR) 37.5 MG CAPSULE SR 24 HR Take 37.5 mg by mouth every day.     • topiramate (TOPAMAX) 25 MG Tab Take 25 mg by mouth every day.     • prednisONE (DELTASONE) 2.5 MG Tab Take 7.5 mg by mouth every day.     • montelukast (SINGULAIR) 10 MG Tab Take 10 mg by mouth every day.     • omeprazole (PRILOSEC) 20 MG delayed-release capsule Take 20 mg by mouth 2 times a day.     • ibuprofen (MOTRIN) 800 MG Tab Take 800 mg by mouth 3 times a day as needed.     • Cholecalciferol 15520 UNIT Cap every 48 hours.     • Hydrocod Polst-CPM Polst ER (TUSSIONEX) 10-8 MG/5ML Suspension Extended Release Take 5 mL by mouth every 12 hours as needed.     • budesonide-formoterol (SYMBICORT) 80-4.5 MCG/ACT Aerosol Inhale 2 Puffs 2 Times a Day.     • benzonatate (TESSALON) 100 MG Cap Take 100 mg by mouth 2 times a day as needed.     • mycophenolate (CELLCEPT) 500 MG tablet Take 1,000 mg by mouth 2 times a day.     • methocarbamol (ROBAXIN) 750 MG Tab Take 750 mg by mouth 3 times a day as needed.     • HYDROcodone-acetaminophen (NORCO) 7.5-325 MG per tablet Take 2 Tabs by mouth every 6 hours as needed.     • hydroCHLOROthiazide (HYDRODIURIL) 25 MG Tab Take 25 mg by mouth every day.     • albuterol (VENTOLIN OR PROVENTIL) 108 (90 BASE) MCG/ACT AERS Inhale 2 Puffs by mouth every 6 hours as needed for Shortness of Breath. 1 Inhaler 0   • promethazine-codeine (PHENERGAN-CODEINE) 6.25-10 MG/5ML SYRP Take 5 mL by mouth 4 times a day as needed for Cough. (Patient not taking: Reported on 12/4/2020) 120 mL 0     No current facility-administered medications on file prior to visit.       Allergies: Patient has no known allergies.    ROS:   Review of Systems   Constitutional: Positive for malaise/fatigue. Negative for chills, fever and weight loss.   HENT: Negative for congestion, sinus pain and sore throat.    Eyes:  "Negative for pain and discharge.   Respiratory: Positive for shortness of breath and wheezing. Negative for cough, hemoptysis, sputum production and stridor.    Cardiovascular: Negative for chest pain, orthopnea and leg swelling.   Gastrointestinal: Negative for abdominal pain, diarrhea, nausea and vomiting.   Genitourinary: Negative for dysuria, frequency and urgency.   Musculoskeletal: Negative for myalgias.   Skin: Negative for rash.   Neurological: Negative for dizziness, sensory change, focal weakness, loss of consciousness and headaches.   Psychiatric/Behavioral: Negative.    All other systems reviewed and are negative.    Vitals:  /72 (BP Location: Right arm, Patient Position: Sitting, BP Cuff Size: Large adult)   Pulse 92   Ht 1.727 m (5' 8\")   Wt (!) 141 kg (310 lb)   SpO2 93%     Physical Exam:  Physical Exam  Vitals and nursing note reviewed.   Constitutional:       General: She is not in acute distress.     Appearance: Normal appearance. She is well-developed. She is obese. She is not ill-appearing or diaphoretic.      Comments: Very pleasant   Eyes:      General: No scleral icterus.        Right eye: No discharge.         Left eye: No discharge.      Conjunctiva/sclera: Conjunctivae normal.      Pupils: Pupils are equal, round, and reactive to light.   Neck:      Thyroid: No thyromegaly.      Vascular: No JVD.   Cardiovascular:      Rate and Rhythm: Normal rate and regular rhythm.      Heart sounds: Normal heart sounds. No murmur. No gallop.    Pulmonary:      Effort: Pulmonary effort is normal. No respiratory distress.      Breath sounds: Rales (scattered distant) present. No wheezing.   Abdominal:      General: There is no distension.      Palpations: Abdomen is soft.      Tenderness: There is no abdominal tenderness. There is no guarding.      Comments: Globus   Genitourinary:     Comments: Deferred  Musculoskeletal:         General: No tenderness.      Right lower leg: No edema.      Left " lower leg: No edema.   Lymphadenopathy:      Cervical: No cervical adenopathy.   Skin:     General: Skin is warm and dry.      Capillary Refill: Capillary refill takes less than 2 seconds.      Coloration: Skin is not pale.      Findings: No bruising, erythema or rash.   Neurological:      Mental Status: She is alert and oriented to person, place, and time.      Cranial Nerves: No cranial nerve deficit.      Sensory: No sensory deficit.      Motor: No abnormal muscle tone.   Psychiatric:         Mood and Affect: Mood normal.         Behavior: Behavior normal.         Thought Content: Thought content normal.         Judgment: Judgment normal.         Laboratory Data:    PFTs as reviewed by me personally show:  8/2020: FVC 2.90 L, 65% predicted, FEV1 2.27 L, 63% predicted, ratio 78%, TLC 4.11 L, 70% predicted, DLCO 80% predicted  1/2021: FVC 2.85 L, 67% predicted, FEV1 1.72 L, 49% predicted, ratio 60%, TLC 4.82 L, 85% predicted, DLCO 102% predicted    6MWT:   1/2021: 1320 ft with desaturations to 88%.  1400 feet without desaturations.    Imaging as reviewed by me personally show:    CT chest 5/2020: multiple small nodular areas of consolidation and GGO in random distribution throughout both lungs with no honeycombing cystic changes of bronchiectasis.  No mediastinal or hilar lymphadenopathy.  No interval change since 12/2019.     Assessment/Plan:    Problem List Items Addressed This Visit     Interstitial lung disease (HCC)     Extensive prior work-up for interstitial lung disease at [facility] including surgical lung biopsy in 2019, working diagnosis of NSIP/OP related to underlying undifferentiated connective tissue disease, possible NSIP associated with Sjogren's syndrome.  SSA positive, however lip bx negative for Sjogren's findings.     No O2- multi ox 2/201 on room air no desaturations.  mMRC 2/ NYHA II.     Plan:  - PFTs and 6-minute walk test ordered prior to [facility] appt in 4/2021  - Continue MMF  2 g/day, pending increase to 2.5/day  - Continue prednisone 7.5 mg p.o. daily.   - Pending Symbicort 160 as well  - A pulmonary rehab not realistic as she has exhausted her vacation/sick leave.  Plans to get rowing machine/exercise bike and start exercise regimen at home  - Has not made appointment with new rheumatologist  - PCV13: 6/2020; influenza 9/2020; letter written to prioritize administration of COVID vaccine         Relevant Orders    PULMONARY FUNCTION TESTS -Test requested: Complete Pulmonary Function Test    Exercise Test for Bronchospasm / 6-Minute Walk    REFERRAL TO [facility] (HIP)    Multiple Oximetry    OLIVIA on CPAP     Upcoming sleep appointment with [facility] in 6/2021, previously followed at [facility]  Currently APAP 5-15 cmH2O         Class 3 severe obesity with serious comorbidity and body mass index (BMI) of 45.0 to 49.9 in adult (HCC)     Counseled about the need for weight loss for long-term health risk reduction  If lung transplant is a necessity in the future will also need to get weight down  -Referral placed to weight management clinic         Relevant Orders    REFERRAL TO [facility] (HIP)    Anemia of chronic disease     Labs 11/2020: chronic microcytic anemia, iron saturation 9%, iron and ferritin lower limit of normal.  TIBC, B12 and folate WNL.     -Overall pattern suggestive of combination anemia of chronic disease and SYMONE  -improving, cont iron supplementation OTC ferrous sulfate             Return in about 4 months (around 6/26/2021).     This note was generated using voice recognition software which has a chance of producing errors of grammar and possibly content.  I have made every reasonable attempt to find and correct any obvious errors, but it should be expected that some may not be found prior to finalization of this note.  __________  Malick Young MD  Pulmonary and Critical Care Medicine  [facility]

## 2021-02-27 NOTE — ASSESSMENT & PLAN NOTE
Upcoming sleep appointment with [facility] in 6/2021, previously followed at [facility]  Currently APAP 5-15 cmH2O

## 2021-02-27 NOTE — ASSESSMENT & PLAN NOTE
Extensive prior work-up for interstitial lung disease at [facility] at [place] including surgical lung biopsy in 2019, working diagnosis of NSIP/OP related to underlying undifferentiated connective tissue disease, possible NSIP associated with Sjogren's syndrome.  SSA positive, however lip bx negative for Sjogren's findings.     No O2- multi ox 2/201 on room air no desaturations.  mMRC 2/ NYHA II.     Plan:  - PFTs and 6-minute walk test ordered prior to [facility] appt in 4/2021  - Continue MMF 2 g/day, pending increase to 2.5/day  - Continue prednisone 7.5 mg p.o. daily.   - Pending Symbicort 160 as well  - A pulmonary rehab not realistic as she has exhausted her vacation/sick leave.  Plans to get rowing machine/exercise bike and start exercise regimen at home  - Has not made appointment with new rheumatologist  - PCV13: 6/2020; influenza 9/2020; letter written to prioritize administration of COVID vaccine

## 2021-02-27 NOTE — ASSESSMENT & PLAN NOTE
Counseled about the need for weight loss for long-term health risk reduction  If lung transplant is a necessity in the future will also need to get weight down  -Referral placed to weight management clinic

## 2021-02-27 NOTE — ASSESSMENT & PLAN NOTE
Labs 11/2020: chronic microcytic anemia, iron saturation 9%, iron and ferritin lower limit of normal.  TIBC, B12 and folate WNL.     -Overall pattern suggestive of combination anemia of chronic disease and SYMONE  -improving, cont iron supplementation OTC ferrous sulfate

## 2021-04-23 ENCOUNTER — NON-PROVIDER VISIT (OUTPATIENT)
Dept: SLEEP MEDICINE | Facility: MEDICAL CENTER | Age: 41
End: 2021-04-23
Attending: INTERNAL MEDICINE
Payer: COMMERCIAL

## 2021-04-23 VITALS — BODY MASS INDEX: 44.41 KG/M2 | WEIGHT: 293 LBS | HEIGHT: 68 IN

## 2021-04-23 VITALS — WEIGHT: 293 LBS | HEIGHT: 68 IN | BODY MASS INDEX: 44.41 KG/M2

## 2021-04-23 DIAGNOSIS — J84.9 INTERSTITIAL LUNG DISEASE (HCC): ICD-10-CM

## 2021-04-23 ASSESSMENT — PULMONARY FUNCTION TESTS
FEV1/FVC_PERCENT_PREDICTED: 75
FEV1/FVC_PERCENT_PREDICTED: 82
FVC: 3.17
FVC: 2.95
FEV1/FVC_PERCENT_CHANGE: 15
FVC_LLN: 3.54
FEV1/FVC_PERCENT_PREDICTED: 86
FEV1/FVC_PERCENT_PREDICTED: 74
FEV1_PREDICTED: 3.46
FEV1_PERCENT_PREDICTED: 65
FEV1/FVC: 61
FEV1_LLN: 2.89
FEV1/FVC: 61
FEV1/FVC_PERCENT_CHANGE: 343
FEV1: 1.81
FEV1/FVC: 70.98
FEV1_PERCENT_CHANGE: 7
FVC_PERCENT_PREDICTED: 69
FEV1/FVC_PREDICTED: 82
FEV1/FVC_PERCENT_LLN: 69
FEV1/FVC_PERCENT_PREDICTED: 88
FVC_PREDICTED: 4.24
FEV1_PERCENT_CHANGE: 24
FEV1/FVC: 71
FVC_PERCENT_PREDICTED: 74
FEV1: 2.25
FEV1_PERCENT_PREDICTED: 52

## 2021-04-23 ASSESSMENT — 6 MINUTE WALK TEST (6MWT)
PERCENT OF NORMAL WALKED: 68
HEART RATE: 93
COMMENTS: DONE ON 2 LPM OXYGEN.
SITTING BLOOD PRESSURE: 132/80
SAO2 AT 1 MIN: 94
PERCEIVED FATIGUE AT 3 MIN: 1
SAO2 AT 1 MIN: 91
BLOOD PRESSURE AT 2 MIN: 140/80
BLOOD PRESSURE AT 1 MIN: 144/80
SAO2 AT 5 MIN: 91
HEART RATE AT 3 MIN: 142
HEART RATE AT 1 MIN: 130
COMMENTS: DONE ON ROOM AIR.
PERCEIVED FATIGUE AT 5 MIN: 1
HEART RATE AT 1 MIN: 124
SAO2 AT 3 MIN: 88
HEART RATE AT 4 MIN: 134
HEART RATE AT 2 MIN: 108
PERCEIVED FATIGUE AT 1 MIN: 1
PERCEIVED BREATHLESSNESS AT 1 MIN: .5
PERCEIVED BREATHLESSNESS AT 3 MIN: 1
PERCEIVED FATIGUE AT 4 MIN: .5
SAO2 AT 2 MIN: 97
PERCEIVED FATIGUE AT 2 MIN: 1
PERCEIVED FATIGUE AT 1 MIN: .5
SAO2 AT 2 MIN: 88
PERCEIVED BREATHLESSNESS AT 2 MIN: 1
PERCEIVED BREATHLESSNESS AT 2 MIN: .5
SAO2 AT 4 MIN: 96
PERCEIVED BREATHLESSNESS AT 1 MIN: .5
NUMBER OF RESTS: 0
HEART RATE AT 2 MIN: 134
COMMENTS: DONE ON 2 LPM OXYGEN.
O2 SAT PERCENT ROOM AIR: 95
COMMENTS: DONE ON ROOM AIR.
COMMENTS: DONE ON 2 LPM OXYGEN.
HEART RATE AT 6 MIN: 152
COMMENTS: DONE ON 2 LPM OXYGEN.
TOTAL REST TIME: 0
HEART RATE AT 5 MIN: 146
COMMENTS: DONE ON ROOM AIR.
SAO2 AT 6 MIN: 91
PERCEIVED FATIGUE AT 2 MIN: 0
PERCEIVED FATIGUE AT 6 MIN: 1
PERCEIVED BREATHLESSNESS AT 5 MIN: 1
BLOOD PRESSURE: LEFT ARM
PERCEIVED BREATHLESSNESS AT 4 MIN: .5
PERCEIVED BREATHLESSNESS AT 6 MIN: 1

## 2021-04-23 NOTE — PROCEDURES
Tech: Liliana Payne, RRT, CPFT  Tech notes: Good patient effort & cooperation.  6MWT began on room air, then placed on 2 lpm oxygen, due to desaturation.  1,080 ft walked, no stops.  68% predicted walked.    Interpretation:  The patient walked 1080 feet or 68% predicted.  Oxygen desaturations noted to 88%.  Supplemental oxygen was titrated on 2 L/min.    Recommendations:  Oxygen at 2 L/min with exertion.

## 2021-04-28 PROCEDURE — 94618 PULMONARY STRESS TESTING: CPT | Performed by: INTERNAL MEDICINE

## 2021-04-29 ENCOUNTER — PATIENT MESSAGE (OUTPATIENT)
Dept: SLEEP MEDICINE | Facility: MEDICAL CENTER | Age: 41
End: 2021-04-29

## 2021-04-30 PROCEDURE — 94726 PLETHYSMOGRAPHY LUNG VOLUMES: CPT | Performed by: INTERNAL MEDICINE

## 2021-04-30 PROCEDURE — 94729 DIFFUSING CAPACITY: CPT | Performed by: INTERNAL MEDICINE

## 2021-04-30 PROCEDURE — 94060 EVALUATION OF WHEEZING: CPT | Performed by: INTERNAL MEDICINE

## 2021-05-03 ENCOUNTER — PATIENT MESSAGE (OUTPATIENT)
Dept: SLEEP MEDICINE | Facility: MEDICAL CENTER | Age: 41
End: 2021-05-03

## 2021-05-03 DIAGNOSIS — J84.9 INTERSTITIAL LUNG DISEASE (HCC): ICD-10-CM

## 2021-05-03 NOTE — TELEPHONE ENCOUNTER
From: Lara Alexander  To: Medical Assistant Lucy ROA  Sent: 5/3/2021 10:42 AM PDT  Subject: Test Result Question    Good morning Lucy. I am at [place] for my follow up with my specialist and he needs me to have another PFT and 6mw around June 16 ish. I am not sure if there is time to schedule it?    Thank you,  Lara      ----- Message -----   From:Medical Assistant Lucy ROA   Sent:4/29/2021 4:10 PM PDT   To:Lara Alexander   Subject:RE: Test Result Question    Good Afternoon Lara,     I just faxed over your PFT and 6MW to Dr Short at [place].     Thanks,  Lucy       ----- Message -----   From:Lara Alexander   Sent:4/29/2021 8:20 AM PDT   To:Physician Malick Young   Subject:Test Result Question    I am not sure if my specialist at [place] has asked for the results of the PFT and 6MW, but if not, can they please be sent over? I meet with them on Monday May 3 in the early morning.    Thank you so much.  Lara

## 2021-05-05 ENCOUNTER — PATIENT MESSAGE (OUTPATIENT)
Dept: SLEEP MEDICINE | Facility: MEDICAL CENTER | Age: 41
End: 2021-05-05

## 2021-05-06 NOTE — TELEPHONE ENCOUNTER
From: Lara Alexander  To: Medical Assistant Lucy ROA  Sent: 5/5/2021 6:00 PM PDT  Subject: Test Result Question    This is perfect. Thank you for getting me in so quick. The 16th of June is great. Less time I take off work the better. Thank you again.      ----- Message -----   From:Medical Assistant Lucy ROA   Sent:5/3/2021 3:09 PM PDT   To:Lara Alexander   Subject:RE: Test Result Question    Good Afternoon Lara,  I scheduled you for a Pulmonary function Test on 06/16/2021 at 9:45am and a 6 minute walk at 10:45am. Then you see Dr Young after those test are completed. Let me know if you have any questions and if this is okay.   ThanksLucy       ----- Message -----   From:Lara Alexander   Sent:5/3/2021 10:42 AM PDT   To:Medical Assistant Lucy ROA   Subject:Test Result Question    Good morning Lucy. I am at [place] for my follow up with my specialist and he needs me to have another PFT and 6mw around June 16 ish. I am not sure if there is time to schedule it?    Thank you,  Lara      ----- Message -----   From:Medical Assistant Lucy ROA   Sent:4/29/2021 4:10 PM PDT   To:Lara Alexander   Subject:RE: Test Result Question    Good Afternoon Lara,     I just faxed over your PFT and 6MW to Dr Short at [place].     Thanks,  Lucy       ----- Message -----   From:Lara Alexander   Sent:4/29/2021 8:20 AM PDT   To:Physician Malick Young   Subject:Test Result Question    I am not sure if my specialist at [place] has asked for the results of the PFT and 6MW, but if not, can they please be sent over? I meet with them on Monday May 3 in the early morning.    Thank you so much.  Lara

## 2021-06-16 ENCOUNTER — NON-PROVIDER VISIT (OUTPATIENT)
Dept: SLEEP MEDICINE | Facility: MEDICAL CENTER | Age: 41
End: 2021-06-16
Attending: INTERNAL MEDICINE
Payer: COMMERCIAL

## 2021-06-16 ENCOUNTER — OFFICE VISIT (OUTPATIENT)
Dept: SLEEP MEDICINE | Facility: MEDICAL CENTER | Age: 41
End: 2021-06-16
Payer: COMMERCIAL

## 2021-06-16 VITALS
OXYGEN SATURATION: 96 % | WEIGHT: 293 LBS | SYSTOLIC BLOOD PRESSURE: 114 MMHG | DIASTOLIC BLOOD PRESSURE: 74 MMHG | BODY MASS INDEX: 44.41 KG/M2 | HEIGHT: 68 IN | HEART RATE: 96 BPM

## 2021-06-16 VITALS — WEIGHT: 293 LBS | BODY MASS INDEX: 44.41 KG/M2 | HEIGHT: 68 IN

## 2021-06-16 DIAGNOSIS — E66.01 CLASS 3 SEVERE OBESITY WITH SERIOUS COMORBIDITY AND BODY MASS INDEX (BMI) OF 45.0 TO 49.9 IN ADULT, UNSPECIFIED OBESITY TYPE (HCC): ICD-10-CM

## 2021-06-16 DIAGNOSIS — G47.33 OSA ON CPAP: ICD-10-CM

## 2021-06-16 DIAGNOSIS — J84.9 INTERSTITIAL LUNG DISEASE (HCC): ICD-10-CM

## 2021-06-16 PROCEDURE — 94618 PULMONARY STRESS TESTING: CPT | Performed by: INTERNAL MEDICINE

## 2021-06-16 PROCEDURE — 99215 OFFICE O/P EST HI 40 MIN: CPT | Mod: 25 | Performed by: INTERNAL MEDICINE

## 2021-06-16 PROCEDURE — 94010 BREATHING CAPACITY TEST: CPT | Performed by: INTERNAL MEDICINE

## 2021-06-16 PROCEDURE — 94726 PLETHYSMOGRAPHY LUNG VOLUMES: CPT | Performed by: INTERNAL MEDICINE

## 2021-06-16 PROCEDURE — 94729 DIFFUSING CAPACITY: CPT | Performed by: INTERNAL MEDICINE

## 2021-06-16 RX ORDER — OMEPRAZOLE 20 MG/1
20 CAPSULE, DELAYED RELEASE ORAL 2 TIMES DAILY
Qty: 30 CAPSULE | Refills: 11 | Status: SHIPPED | OUTPATIENT
Start: 2021-06-16

## 2021-06-16 RX ORDER — MONTELUKAST SODIUM 10 MG/1
10 TABLET ORAL DAILY
Qty: 30 TABLET | Refills: 11 | Status: SHIPPED | OUTPATIENT
Start: 2021-06-16 | End: 2022-07-18

## 2021-06-16 RX ORDER — BUDESONIDE AND FORMOTEROL FUMARATE DIHYDRATE 160; 4.5 UG/1; UG/1
2 AEROSOL RESPIRATORY (INHALATION) 2 TIMES DAILY
COMMUNITY
Start: 2021-06-04

## 2021-06-16 ASSESSMENT — PULMONARY FUNCTION TESTS
FEV1/FVC_PERCENT_PREDICTED: 64
FEV1/FVC: 53
FEV1/FVC: 53
FEV1_PREDICTED: 3.43
FVC_PERCENT_PREDICTED: 68
FVC_LLN: 3.53
FEV1_LLN: 2.87
FVC_PREDICTED: 4.22
FEV1/FVC_PERCENT_PREDICTED: 65
FEV1/FVC_PERCENT_PREDICTED: 81
FEV1/FVC_PREDICTED: 82
FEV1: 1.52
FEV1/FVC_PERCENT_LLN: 68
FVC: 2.88
FEV1_PERCENT_PREDICTED: 44

## 2021-06-16 ASSESSMENT — ENCOUNTER SYMPTOMS
SORE THROAT: 0
HEADACHES: 0
ORTHOPNEA: 0
ABDOMINAL PAIN: 0
HEMOPTYSIS: 0
SHORTNESS OF BREATH: 1
EYE DISCHARGE: 0
STRIDOR: 0
WEIGHT LOSS: 0
EYE PAIN: 0
DIARRHEA: 0
SENSORY CHANGE: 0
FEVER: 0
CHILLS: 0
DIZZINESS: 0
PSYCHIATRIC NEGATIVE: 1
MYALGIAS: 0
SPUTUM PRODUCTION: 0
LOSS OF CONSCIOUSNESS: 0
COUGH: 0
VOMITING: 0
SINUS PAIN: 0
FOCAL WEAKNESS: 0
NAUSEA: 0
WHEEZING: 1

## 2021-06-16 ASSESSMENT — 6 MINUTE WALK TEST (6MWT)
HEART RATE AT 2 MIN: 143
O2 SAT PERCENT ROOM AIR: 96
PERCEIVED BREATHLESSNESS AT 2 MIN: 0.5
HEART RATE AT 2 MIN: 131
BLOOD PRESSURE AT 1 MIN: 122/78
TOTAL REST TIME: 0
PERCENT OF NORMAL WALKED: 68
PERCEIVED BREATHLESSNESS AT 2 MIN: 0.5
SAO2 AT 2 MIN: 92
PERCEIVED FATIGUE AT 2 MIN: 0
PERCEIVED FATIGUE AT 4 MIN: 0
HEART RATE AT 6 MIN: 148
NUMBER OF RESTS: 0
SAO2 AT 4 MIN: 89
SAO2 AT 1 MIN: 91
HEART RATE AT 3 MIN: 131
HEART RATE AT 1 MIN: 146
PERCEIVED FATIGUE AT 1 MIN: 0
PERCEIVED FATIGUE AT 6 MIN: 0
PERCEIVED BREATHLESSNESS AT 4 MIN: 0.5
SAO2 AT 6 MIN: 91
HEART RATE AT 4 MIN: 140
HEART RATE: 89
HEART RATE AT 5 MIN: 138
SAO2 AT 5 MIN: 88
PERCEIVED BREATHLESSNESS AT 1 MIN: 0.5
SITTING BLOOD PRESSURE: 122/78
SAO2 AT 3 MIN: 90
AMBULATES WITH O2: WITHOUT O2
PERCEIVED FATIGUE AT 2 MIN: 0
PERCEIVED FATIGUE AT 5 MIN: 0
PERCEIVED FATIGUE AT 1 MIN: 0
PERCEIVED BREATHLESSNESS AT 5 MIN: 0.5
COMMENTS: TEST ON ROOM AIR.
PERCEIVED FATIGUE AT 3 MIN: 0
SAO2 AT 2 MIN: 90
SAO2 AT 1 MIN: 91
PERCEIVED BREATHLESSNESS AT 3 MIN: 0.5
BLOOD PRESSURE: LEFT ARM
PERCEIVED BREATHLESSNESS AT 1 MIN: 0.5
PERCEIVED BREATHLESSNESS AT 6 MIN: 0.5
HEART RATE AT 1 MIN: 131

## 2021-06-16 NOTE — PROGRESS NOTES
Pulmonary Clinic Note    Chief Complaint:  Chief Complaint   Patient presents with   • Follow-Up     ILD. Last seen 02/26/21   • Results     6MW/PFT 06/16/21   • Medication Refill     Prilosec and singular     HPI:   Lara Alexander is a very pleasant 41 y.o. female never tobacco smoker who returns to the pulmonary clinic for follow-up of suspected organizing pneumonia/unclassified ILD, possible NSIP associated with Sjogren's syndrome.  Initial consultation in 12/2020, previously under the care of pulmonologist Dr Plasencia at [place].     Lara symptoms began in 2015 when she developed an allergic reaction for which she was seen by an allergist.  She underwent skin testing which was negative and was diagnosed with asthma and treated with inhalers with no symptomatic improvement.  She had a dry cough for about 3 years which was stable, then about 2 years ago the cough became more vigorous and refractory and would be so severe that it would bring her to the point of gagging and throwing up.  Cough is minimally productive primarily of clear phlegm.  She established care with Dr. Plasencia and underwent a surgical lung biopsy in 2019, reportedly showing a pattern suggestive of UIP with fibroblastic foci and eosinophilia, however records also report uniform cellular expansion of interstitium suggestive of NSIP/OP.  She subsequently underwent an extensive serologic evaluation and was referred to [facility] pulmonologist Dr. Ji Short, as well as establish care with rheumatologist Dr. Celia salazar in [place].  Multidisciplinary discussion at [facility] concluded diagnosis of undifferentiated CTD-related ILD, based on a positive SSA but subsequent negative lip biopsy for Sjogrens.     CT chest 5/2020: multiple small nodular areas of consolidation and GGO in random distribution throughout both lungs with no honeycombing cystic changes of bronchiectasis.  No mediastinal or hilar lymphadenopathy.  No interval change since  12/2019.     PFTs   8/2020: FVC 2.90 L, 65% predicted, FEV1 2.27 L, 63% predicted, ratio 78%, TLC 4.11 L, 70% predicted, DLCO 80% predicted  1/2021: FVC 2.85 L, 67% predicted, FEV1 1.72 L, 49% predicted, ratio 60%, TLC 4.82 L, 85% predicted, DLCO 102% predicted  6/2021: FVC 2.88 L, 68% predicted, FEV1 1.52 L, 44% predicted, ratio 53%, TLC 4.88 L, 86% predicted, DLCO 94% predicted    6MWT:  1/2021: 1320 ft with desaturations to 88%.  6/2021: 1080ft with desats to 89%.       Interval events since last visit 2/2021:  CT chest 5/2021 at [facility] shows stable/unchanged parenchymal disease. Based on this [facility] increased MMF 2500mg, pred back to 20mg, tapered to 10mg currently. Next appt 6/28. On Symbicort 160.    Overall feeling better, less dyspneic which she correlates with the prednisone taper. Less dyspneic during the day, cough improving. Still short of breath after about 1/4 mile. Plans to start swimming laps at local pool. 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    Not using CPAP due to Levi device recall.    MMRC Grade: 2: Walks slower than friends due to breathlessness, has to stop at own pace.     Past Medical History:   Diagnosis Date   • Asthma    • Back pain    • Back pain    • Chest pain    • Chest tightness    • Chickenpox    • Chills    • Cough    • Depression    • Earache    • Fatigue    • Frequent headaches    • Frequent urination    • GERD (gastroesophageal reflux disease)    • Heartburn    • Hyperlipidemia    • Hypertension    • Hypothyroidism    • Insomnia    • Irregular periods    • Morning headache    • Nasal drainage    • Nausea    • Obesity    • Painful joint    • Pneumonia    • Restless leg syndrome    • Rhinitis    • Shortness of breath    • Sleep apnea    • Sore muscles    • Sputum production    • Sweat, sweating, excessive    • Swelling of lower extremity    • Tonsillitis    • Weakness    • Wears glasses    • Wheezing        Past  Surgical History:   Procedure Laterality Date   • BRONCHOSCOPY     • LUNG BIOPSY OPEN     • TONSILLECTOMY         Social History     Socioeconomic History   • Marital status: Single     Spouse name: Not on file   • Number of children: Not on file   • Years of education: Not on file   • Highest education level: Not on file   Occupational History   • Not on file   Tobacco Use   • Smoking status: Never Smoker   • Smokeless tobacco: Never Used   Vaping Use   • Vaping Use: Never used   Substance and Sexual Activity   • Alcohol use: Not Currently     Comment: Maybe have a drink or two a year   • Drug use: No   • Sexual activity: Not on file   Other Topics Concern   • Not on file   Social History Narrative   • Not on file     Social Determinants of Health     Financial Resource Strain:    • Difficulty of Paying Living Expenses:    Food Insecurity:    • Worried About Running Out of Food in the Last Year:    • Ran Out of Food in the Last Year:    Transportation Needs:    • Lack of Transportation (Medical):    • Lack of Transportation (Non-Medical):    Physical Activity:    • Days of Exercise per Week:    • Minutes of Exercise per Session:    Stress:    • Feeling of Stress :    Social Connections:    • Frequency of Communication with Friends and Family:    • Frequency of Social Gatherings with Friends and Family:    • Attends [employer] Services:    • Active Member of Clubs or Organizations:    • Attends Club or Organization Meetings:    • Marital Status:    Intimate Partner Violence:    • Fear of Current or Ex-Partner:    • Emotionally Abused:    • Physically Abused:    • Sexually Abused:           Family History   Problem Relation Age of Onset   • Cancer Maternal Grandfather    • Breast Cancer Paternal Grandmother    • Stroke Paternal Grandfather        Current Outpatient Medications on File Prior to Visit   Medication Sig Dispense Refill   • SYMBICORT 160-4.5 MCG/ACT Aerosol Inhale 2 Puffs 2 times a day.     • Ferrous  Sulfate (IRON PO) Take  by mouth every 48 hours.     • venlafaxine XR (EFFEXOR XR) 37.5 MG CAPSULE SR 24 HR Take 37.5 mg by mouth every day.     • prednisONE (DELTASONE) 2.5 MG Tab Take 10 mg by mouth every day.     • ibuprofen (MOTRIN) 800 MG Tab Take 800 mg by mouth 3 times a day as needed.     • Cholecalciferol 35272 UNIT Cap every 48 hours.     • Hydrocod Polst-CPM Polst ER (TUSSIONEX) 10-8 MG/5ML Suspension Extended Release Take 5 mL by mouth every 12 hours as needed.     • benzonatate (TESSALON) 100 MG Cap Take 100 mg by mouth 2 times a day as needed.     • mycophenolate (CELLCEPT) 500 MG tablet Take 1,250 mg by mouth 2 times a day.     • methocarbamol (ROBAXIN) 750 MG Tab Take 750 mg by mouth 3 times a day as needed.     • HYDROcodone-acetaminophen (NORCO) 7.5-325 MG per tablet Take 2 Tabs by mouth every 6 hours as needed.     • hydroCHLOROthiazide (HYDRODIURIL) 25 MG Tab Take 25 mg by mouth every day.     • albuterol (VENTOLIN OR PROVENTIL) 108 (90 BASE) MCG/ACT AERS Inhale 2 Puffs by mouth every 6 hours as needed for Shortness of Breath. 1 Inhaler 0     No current facility-administered medications on file prior to visit.       Allergies: Patient has no known allergies.    ROS:   Review of Systems   Constitutional: Positive for malaise/fatigue. Negative for chills, fever and weight loss.   HENT: Negative for congestion, sinus pain and sore throat.    Eyes: Negative for pain and discharge.   Respiratory: Positive for shortness of breath ( improving) and wheezing ( improving). Negative for cough, hemoptysis, sputum production and stridor.    Cardiovascular: Negative for chest pain, orthopnea and leg swelling.   Gastrointestinal: Negative for abdominal pain, diarrhea, nausea and vomiting.   Genitourinary: Negative for dysuria, frequency and urgency.   Musculoskeletal: Negative for myalgias.   Skin: Negative for rash.   Neurological: Negative for dizziness, sensory change, focal weakness, loss of consciousness  "and headaches.   Psychiatric/Behavioral: Negative.    All other systems reviewed and are negative.    Vitals:  /74 (BP Location: Left arm, Patient Position: Sitting, BP Cuff Size: Adult)   Pulse 96   Ht 1.727 m (5' 8\")   Wt (!) 139 kg (307 lb)   SpO2 96%     Physical Exam:  Physical Exam  Vitals and nursing note reviewed.   Constitutional:       General: She is not in acute distress.     Appearance: Normal appearance. She is well-developed. She is obese. She is not ill-appearing or diaphoretic.      Comments: Very pleasant   Eyes:      General: No scleral icterus.        Right eye: No discharge.         Left eye: No discharge.      Conjunctiva/sclera: Conjunctivae normal.      Pupils: Pupils are equal, round, and reactive to light.   Neck:      Thyroid: No thyromegaly.      Vascular: No JVD.   Cardiovascular:      Rate and Rhythm: Normal rate and regular rhythm.      Heart sounds: Normal heart sounds. No murmur heard.   No gallop.    Pulmonary:      Effort: Pulmonary effort is normal. No respiratory distress.      Breath sounds: Rales (scattered distant) present. No wheezing.   Abdominal:      General: There is no distension.      Palpations: Abdomen is soft.      Tenderness: There is no abdominal tenderness. There is no guarding.      Comments: Globus   Genitourinary:     Comments: Deferred  Musculoskeletal:         General: No tenderness.      Right lower leg: No edema.      Left lower leg: No edema.   Lymphadenopathy:      Cervical: No cervical adenopathy.   Skin:     General: Skin is warm and dry.      Capillary Refill: Capillary refill takes less than 2 seconds.      Coloration: Skin is not pale.      Findings: No bruising, erythema or rash.   Neurological:      Mental Status: She is alert and oriented to person, place, and time.      Cranial Nerves: No cranial nerve deficit.      Sensory: No sensory deficit.      Motor: No abnormal muscle tone.   Psychiatric:         Mood and Affect: Mood normal.    "      Behavior: Behavior normal.         Thought Content: Thought content normal.         Judgment: Judgment normal.       Laboratory Data:    PFTs as reviewed by me personally show:  8/2020: FVC 2.90 L, 65% predicted, FEV1 2.27 L, 63% predicted, ratio 78%, TLC 4.11 L, 70% predicted, DLCO 80% predicted  1/2021: FVC 2.85 L, 67% predicted, FEV1 1.72 L, 49% predicted, ratio 60%, TLC 4.82 L, 85% predicted, DLCO 102% predicted  6/2021: FVC 2.88 L, 68% predicted, FEV1 1.52 L, 44% predicted, ratio 53%, TLC 4.88 L, 86% predicted, DLCO 94% predicted    6MWT:  1/2021: 1320 ft with desaturations to 88%.  6/2021: 1080ft with desats to 89%.       Imaging as reviewed by me personally show:    CT chest 5/2020: multiple small nodular areas of consolidation and GGO in random distribution throughout both lungs with no honeycombing cystic changes of bronchiectasis.  No mediastinal or hilar lymphadenopathy.  No interval change since 12/2019.    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  SSA positive, however lip bx negative for Sjogren's findings.     No O2- multi ox 2/201 on room air no desaturations.  mMRC 2/ NYHA II.     Plan:  - PFTs and CT chest unchanged. MMF increased to 2.5 g/day, prednisone 10 mg p.o. daily per [facility] recommendations. Subjectively better, feels symptomatically improved with higher dose of prednisone with less cough and dyspnea on exertion. Planning to f/u at [facility] at the end of the month.  - Cont Symbicort 160, recommended spacer  - pulmonary rehab not realistic as she has exhausted her vacation/sick leave.  Plans to  get rowing machine/exercise bike and start exercise regimen at home  - Planning to establish with [facility] rheumatology if necessary  - PCV13: 6/2020; influenza 9/2020; COVID 4/2021         Relevant Medications    montelukast (SINGULAIR) 10 MG Tab    omeprazole (PRILOSEC) 20 MG delayed-release capsule    OLIVIA on CPAP     Upcoming sleep appointment with [facility] in 7/2021  Currently off CPAP given Levi recall         Class 3 severe obesity with serious comorbidity and body mass index (BMI) of 45.0 to 49.9 in adult (HCC)     Weight stable  Counseled about the need for weight loss for long-term health risk reduction  If lung transplant is a necessity in the future will also need to get weight down  -Declined referral placed to weight management clinic, states too much going on right now with work/life and doesn't have time. Plans to start exercising (water aerobics).             Return in about 6 months (around 12/16/2021).     This note was generated using voice recognition software which has a chance of producing errors of grammar and possibly content.  I have made every reasonable attempt to find and correct any obvious errors, but it should be expected that some may not be found prior to finalization of this note.    Time spent in record review prior to patient arrival, reviewing results, and in face-to-face encounter totaled 44 min, excluding any procedures if performed.    __________  Malick Young MD  Pulmonary and Critical Care Medicine  [facility]

## 2021-06-16 NOTE — ASSESSMENT & PLAN NOTE
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  SSA positive, however lip bx negative for Sjogren's findings.     No O2- multi ox 2/201 on room air no desaturations.  mMRC 2/ NYHA II.     Plan:  - PFTs and CT chest unchanged. MMF increased to 2.5 g/day, prednisone 10 mg p.o. daily per [facility] recommendations. Subjectively better, feels symptomatically improved with higher dose of prednisone with less cough and dyspnea on exertion. Planning to f/u at [facility] at the end of the month.  - Cont Symbicort 160, recommended spacer  - pulmonary rehab not realistic as she has exhausted her vacation/sick leave.  Plans to get rowing machine/exercise bike and start exercise regimen at home  - Planning to establish with [facility] rheumatology if necessary  - PCV13: 6/2020; influenza 9/2020; COVID 4/2021

## 2021-06-16 NOTE — PROCEDURES
METHODS:  The baseline vital signs are measured, and oxygen saturation, level of dyspnea, and level of fatigue are monitored during the walk.    RESULTS:  Baseline vital signs at rest were: blood pressure 122/78 mmHg; heart rate 89 bpm; and saturation 96% on room air.  Prior to the test, the patient reported dyspnea rated 0.5/10 and fatigue rated 0/10.  The patient walked 1080 feet (68% predicted).  At the end of the test, heart rate was 148 bpm and oxygen saturation was 91% on room air.  Dyspnea was reported as 0.5/10 and fatigue was reported as 0/10.  __________  Malick Young MD  Pulmonary and Critical Care Medicine  [facility]

## 2021-06-16 NOTE — ASSESSMENT & PLAN NOTE
Weight stable  Counseled about the need for weight loss for long-term health risk reduction  If lung transplant is a necessity in the future will also need to get weight down  -Declined referral placed to weight management clinic, states too much going on right now with work/life and doesn't have time. Plans to start exercising (water aerobics).

## 2021-06-16 NOTE — PROCEDURES
Tech: Liliana Payne, RRT, CPFT  Tech notes: Good patient effort & cooperation.  FIVC induced bronchospasm.  The results of this test meet the ATS/ERS standards for acceptability & reproducibility.  Test was performed on the [employer] Body Plethysmograph-Elite DX system.  Predicted values were GLI-2012 for spirometry, GLI- 2017 for DLCO, ITS for Lung Volumes.  The DLCO was uncorrected for Hgb.    Interpretation:  1.  Spirometry is consistent with a severe obstructive ventilatory defect.  2.  Expiratory reserve volume is reduced, likely attributable to the patient's reported BMI 46.7.  3.  Diffusion capacity is preserved.  4.  Compared to prior testing, total lung capacity has remained essentially stable.  FEV1 has declined slightly from 1.72 L to 1.52L.  5.  Flow volume loop is consistent with the above interpretation.  __________  Malick Young MD  Pulmonary and Critical Care Medicine  [facility]

## 2021-07-19 ENCOUNTER — APPOINTMENT (OUTPATIENT)
Dept: SLEEP MEDICINE | Facility: MEDICAL CENTER | Age: 41
End: 2021-07-19
Payer: COMMERCIAL

## 2021-10-28 DIAGNOSIS — J84.9 INTERSTITIAL LUNG DISEASE (HCC): ICD-10-CM

## 2021-12-03 ENCOUNTER — NON-PROVIDER VISIT (OUTPATIENT)
Dept: SLEEP MEDICINE | Facility: MEDICAL CENTER | Age: 41
End: 2021-12-03
Attending: INTERNAL MEDICINE
Payer: COMMERCIAL

## 2021-12-03 VITALS — BODY MASS INDEX: 44.41 KG/M2 | WEIGHT: 293 LBS | HEIGHT: 68 IN

## 2021-12-03 DIAGNOSIS — J84.9 INTERSTITIAL LUNG DISEASE (HCC): ICD-10-CM

## 2021-12-03 PROCEDURE — 94729 DIFFUSING CAPACITY: CPT | Performed by: INTERNAL MEDICINE

## 2021-12-03 PROCEDURE — 94726 PLETHYSMOGRAPHY LUNG VOLUMES: CPT | Performed by: INTERNAL MEDICINE

## 2021-12-03 PROCEDURE — 94618 PULMONARY STRESS TESTING: CPT | Performed by: INTERNAL MEDICINE

## 2021-12-03 PROCEDURE — 94060 EVALUATION OF WHEEZING: CPT | Performed by: INTERNAL MEDICINE

## 2021-12-03 ASSESSMENT — PULMONARY FUNCTION TESTS
FEV1_PERCENT_PREDICTED: 49
FEV1/FVC_PERCENT_LLN: 68
FEV1: 1.7
FEV1_PERCENT_CHANGE: 11
FVC_PREDICTED: 4.22
FEV1/FVC_PERCENT_CHANGE: 21
FEV1/FVC: 57
FEV1/FVC_PERCENT_PREDICTED: 70
FEV1/FVC_PREDICTED: 82
FEV1/FVC_PERCENT_CHANGE: 318
FVC_PERCENT_PREDICTED: 78
FEV1_LLN: 2.87
FEV1/FVC_PERCENT_PREDICTED: 70
FEV1_PREDICTED: 3.43
FVC: 3.3
FEV1/FVC_PERCENT_PREDICTED: 81
FEV1_PERCENT_CHANGE: 35
FVC: 2.97
FEV1/FVC: 70
FEV1/FVC: 69.7
FEV1/FVC_PERCENT_PREDICTED: 85
FVC_LLN: 3.53
FEV1/FVC_PERCENT_PREDICTED: 86
FEV1: 2.3
FEV1_PERCENT_PREDICTED: 67
FEV1/FVC: 57
FVC_PERCENT_PREDICTED: 70

## 2021-12-03 ASSESSMENT — 6 MINUTE WALK TEST (6MWT)
PERCEIVED BREATHLESSNESS AT 2 MIN: 1
PERCEIVED BREATHLESSNESS AT 3 MIN: 1
HEART RATE AT 1 MIN: 138
PERCEIVED BREATHLESSNESS AT 1 MIN: 1
SAO2 AT 5 MIN: 90
PERCEIVED BREATHLESSNESS AT 6 MIN: 1
SAO2 AT 2 MIN: 92
HEART RATE AT 5 MIN: 149
HEART RATE: 78
HEART RATE AT 3 MIN: 145
SAO2 AT 4 MIN: 90
PERCEIVED BREATHLESSNESS AT 4 MIN: 1
TOTAL REST TIME: 0
SITTING BLOOD PRESSURE: 114/74
HEART RATE AT 1 MIN: 120
NUMBER OF RESTS: 0
HEART RATE AT 2 MIN: 130
AMBULATES WITH O2: WITHOUT O2
PERCEIVED BREATHLESSNESS AT 5 MIN: 1
PERCEIVED FATIGUE AT 3 MIN: 0
PERCEIVED FATIGUE AT 5 MIN: 0
PERCEIVED FATIGUE AT 6 MIN: 0
SAO2 AT 1 MIN: 96
BLOOD PRESSURE AT 1 MIN: 114/74
SAO2 AT 1 MIN: 94
HEART RATE AT 6 MIN: 149
COMMENTS: TEST ON ROOM AIR.
SAO2 AT 6 MIN: 87
PERCEIVED FATIGUE AT 4 MIN: 0
HEART RATE AT 4 MIN: 146
O2 SAT PERCENT ROOM AIR: 93
PERCEIVED FATIGUE AT 1 MIN: 0
PERCENT OF NORMAL WALKED: 68
SAO2 AT 2 MIN: 98
PERCEIVED BREATHLESSNESS AT 2 MIN: 1
PERCEIVED FATIGUE AT 2 MIN: 0
SAO2 AT 3 MIN: 91
PERCEIVED FATIGUE AT 2 MIN: 0
BLOOD PRESSURE: LEFT ARM
HEART RATE AT 2 MIN: 136
COMMENTS: ADDED 02 2 LPM
PERCEIVED BREATHLESSNESS AT 1 MIN: 0.5
PERCEIVED FATIGUE AT 1 MIN: 0

## 2021-12-03 NOTE — PROCEDURES
Tech: Liliana Payne, RRT, CPFT  Tech notes: Good patient effort & cooperation.  FVC induced bronchospasm.  The results of this test meet the ATS/ERS standards for acceptability & reproducibility.  Test was performed on the [employer] Body Plethysmograph-Elite DX system.  Predicted values were GLI-2012 for spirometry, GLI- 2017 for DLCO, ITS for Lung Volumes.  The DLCO was uncorrected for Hgb.  A bronchodilator of Ventolin HFA -2puffs via spacer administered.  DLCO performed during dilation period.    Interpretation:  Baseline spirometry shows airflow obstruction with FEV1/FVC ratio of 57 and an FEV1 of 1.7 L or 49% predicted.  There is significant bronchodilator response with improvement in FEV1 to 2.3 L or 67% predicted.  Total lung capacity is low normal at 84% predicted.    Diffusion capacity is within normal limits at 97% predicted.  Pulmonary function testing shows airflow obstruction with significant bronchodilator response.  Total lung capacity is low normal and DLCO is within normal limits.  Combination of findings may be seen in early COPD versus uncontrolled asthma.  Correlate clinically.

## 2021-12-03 NOTE — PROCEDURES
Test started on Room Air in minute 6 added 02 to 2 lpm patient finished test on 2 lpm 02.    Interpretation;  Baseline spirometry shows desaturation to 87% at minute 6 at which point patient required supplemental oxygen at 2 L/min.  Distance walked was 1080 feet or 68% predicted.  Patient may benefit from supplemental oxygen at 2 L/min when ambulating long distances.  Exercise tolerance is mildly reduced.

## 2022-04-04 DIAGNOSIS — R06.02 SOB (SHORTNESS OF BREATH): ICD-10-CM

## 2022-04-04 DIAGNOSIS — J84.9 INTERSTITIAL LUNG DISEASE (HCC): ICD-10-CM

## 2022-04-07 ENCOUNTER — PATIENT MESSAGE (OUTPATIENT)
Dept: SLEEP MEDICINE | Facility: MEDICAL CENTER | Age: 42
End: 2022-04-07
Payer: COMMERCIAL

## 2022-04-11 ENCOUNTER — APPOINTMENT (OUTPATIENT)
Dept: SLEEP MEDICINE | Facility: MEDICAL CENTER | Age: 42
End: 2022-04-11
Attending: INTERNAL MEDICINE
Payer: COMMERCIAL

## 2022-05-13 ENCOUNTER — NON-PROVIDER VISIT (OUTPATIENT)
Dept: SLEEP MEDICINE | Facility: MEDICAL CENTER | Age: 42
End: 2022-05-13
Attending: INTERNAL MEDICINE
Payer: COMMERCIAL

## 2022-05-13 VITALS — HEIGHT: 68 IN | BODY MASS INDEX: 44.41 KG/M2 | WEIGHT: 293 LBS

## 2022-05-13 VITALS — BODY MASS INDEX: 44.41 KG/M2 | HEIGHT: 68 IN | WEIGHT: 293 LBS

## 2022-05-13 DIAGNOSIS — R06.02 SOB (SHORTNESS OF BREATH): ICD-10-CM

## 2022-05-13 DIAGNOSIS — J84.9 INTERSTITIAL LUNG DISEASE (HCC): ICD-10-CM

## 2022-05-13 PROCEDURE — 94618 PULMONARY STRESS TESTING: CPT | Performed by: INTERNAL MEDICINE

## 2022-05-13 PROCEDURE — 94010 BREATHING CAPACITY TEST: CPT | Performed by: INTERNAL MEDICINE

## 2022-05-13 ASSESSMENT — 6 MINUTE WALK TEST (6MWT)
SAO2 AT 4 MIN: 86
PERCEIVED FATIGUE AT 6 MIN: 3
SAO2 AT 6 MIN: 85
PERCEIVED BREATHLESSNESS AT 6 MIN: 1
PERCEIVED BREATHLESSNESS AT 2 MIN: 0
PERCEIVED BREATHLESSNESS AT 1 MIN: 0
PERCEIVED BREATHLESSNESS AT 5 MIN: 1
PERCEIVED BREATHLESSNESS AT 2 MIN: 0
O2 SAT PERCENT ROOM AIR: 95
PERCEIVED FATIGUE AT 1 MIN: 1
NUMBER OF RESTS: 0
BLOOD PRESSURE AT 1 MIN: 178/70
PERCEIVED FATIGUE AT 2 MIN: 2
SAO2 AT 1 MIN: 85
PERCEIVED FATIGUE AT 1 MIN: 0
PERCEIVED FATIGUE AT 4 MIN: 2
PERCEIVED FATIGUE AT 2 MIN: 0
SAO2 AT 1 MIN: 91
SAO2 AT 3 MIN: 88
HEART RATE AT 6 MIN: 148
HEART RATE AT 1 MIN: 133
PERCENT OF NORMAL WALKED: 85
SAO2 AT 5 MIN: 85
HEART RATE AT 5 MIN: 187
PERCEIVED BREATHLESSNESS AT 3 MIN: 0
HEART RATE: 87
SAO2 AT 2 MIN: 95
HEART RATE AT 2 MIN: 105
HEART RATE AT 4 MIN: 149
PERCEIVED BREATHLESSNESS AT 4 MIN: 0
HEART RATE AT 2 MIN: 144
BLOOD PRESSURE: LEFT ARM
PERCEIVED FATIGUE AT 5 MIN: 3
SAO2 AT 2 MIN: 85
PERCEIVED BREATHLESSNESS AT 1 MIN: 0
HEART RATE AT 3 MIN: 150
PERCEIVED FATIGUE AT 3 MIN: 2
HEART RATE AT 1 MIN: 120
SITTING BLOOD PRESSURE: 124/88

## 2022-05-13 ASSESSMENT — PULMONARY FUNCTION TESTS
FEV1_PREDICTED: 3.4
FEV1/FVC_PREDICTED: 80.95
FEV1: 1.45
FVC: 2.76
FVC_PERCENT_PREDICTED: 65
FEV1/FVC_PERCENT_PREDICTED: 65
FEV1_PREDICTED: 42
FVC_PREDICTED: 4.2
FEV1/FVC: 53

## 2022-05-13 NOTE — PROCEDURES
Test done on RA. Pt walked 1,320' or 85%.     There is abnormal desaturation with ambulation from 95% at rest on room air to a malcom of 85%.  Supplemental oxygen was not added.  Distance walked was 1320 feet or 85% predicted.  Overall this patient would benefit from the addition of supplemental oxygen.  Exercise tolerance is good.

## 2022-05-13 NOTE — PROCEDURES
Technician: Shira Pastrana [facility]  Tech notes:  Good pt effort and cooperation. ATS standards met.      Interpretation:  Baseline spirometry shows airflow obstruction with FEV1/FVC ratio 52 and an FEV1 of 1.45 L or 42% predicted.  Bronchodilator testing was not performed.  Spirometry suggests obstructive lung disease.

## 2022-06-19 ENCOUNTER — PATIENT MESSAGE (OUTPATIENT)
Dept: SLEEP MEDICINE | Facility: MEDICAL CENTER | Age: 42
End: 2022-06-19
Payer: COMMERCIAL

## 2022-06-20 ENCOUNTER — PATIENT MESSAGE (OUTPATIENT)
Dept: SLEEP MEDICINE | Facility: MEDICAL CENTER | Age: 42
End: 2022-06-20
Payer: COMMERCIAL

## 2022-07-18 DIAGNOSIS — J84.9 INTERSTITIAL LUNG DISEASE (HCC): ICD-10-CM

## 2022-07-18 RX ORDER — MONTELUKAST SODIUM 10 MG/1
TABLET ORAL
Qty: 30 TABLET | Refills: 11 | Status: SHIPPED | OUTPATIENT
Start: 2022-07-18 | End: 2023-07-26

## 2022-07-18 NOTE — TELEPHONE ENCOUNTER
Have we ever prescribed this med? Yes.  If yes, what date? 06/16/21    Last OV: 06/16/21 with Dr Young    Next OV: 10/07/22 with Dr Young    DX: ILD.     Medications:   Requested Prescriptions     Pending Prescriptions Disp Refills   • montelukast (SINGULAIR) 10 MG Tab [Pharmacy Med Name: MONTELUKAST SOD 10 MG TABLET] 30 Tablet 11     Sig: take 1 tablet by mouth once daily

## 2022-08-23 ENCOUNTER — PATIENT MESSAGE (OUTPATIENT)
Dept: SLEEP MEDICINE | Facility: MEDICAL CENTER | Age: 42
End: 2022-08-23
Payer: COMMERCIAL

## 2022-09-07 ASSESSMENT — ENCOUNTER SYMPTOMS
MYALGIAS: 0
CHILLS: 0
EYE DISCHARGE: 0
ORTHOPNEA: 0
FOCAL WEAKNESS: 0
ABDOMINAL PAIN: 0
SORE THROAT: 0
EYE PAIN: 0
COUGH: 0
WEIGHT LOSS: 0
HEADACHES: 0
STRIDOR: 0
HEMOPTYSIS: 0
FEVER: 0
SINUS PAIN: 0
DIZZINESS: 0
NAUSEA: 0
SPUTUM PRODUCTION: 0
PSYCHIATRIC NEGATIVE: 1
SHORTNESS OF BREATH: 1
VOMITING: 0
LOSS OF CONSCIOUSNESS: 0
DIARRHEA: 0
SENSORY CHANGE: 0
WHEEZING: 1

## 2022-09-08 ENCOUNTER — OFFICE VISIT (OUTPATIENT)
Dept: SLEEP MEDICINE | Facility: MEDICAL CENTER | Age: 42
End: 2022-09-08
Payer: COMMERCIAL

## 2022-09-08 VITALS
HEART RATE: 86 BPM | DIASTOLIC BLOOD PRESSURE: 68 MMHG | BODY MASS INDEX: 44.41 KG/M2 | OXYGEN SATURATION: 93 % | HEIGHT: 68 IN | SYSTOLIC BLOOD PRESSURE: 114 MMHG | WEIGHT: 293 LBS

## 2022-09-08 DIAGNOSIS — J84.9 INTERSTITIAL LUNG DISEASE (HCC): ICD-10-CM

## 2022-09-08 DIAGNOSIS — C54.1 ENDOMETRIAL CANCER (HCC): ICD-10-CM

## 2022-09-08 DIAGNOSIS — E66.01 CLASS 3 SEVERE OBESITY WITH SERIOUS COMORBIDITY AND BODY MASS INDEX (BMI) OF 45.0 TO 49.9 IN ADULT, UNSPECIFIED OBESITY TYPE (HCC): ICD-10-CM

## 2022-09-08 DIAGNOSIS — D50.0 IRON DEFICIENCY ANEMIA DUE TO CHRONIC BLOOD LOSS: ICD-10-CM

## 2022-09-08 DIAGNOSIS — Z01.811 PREOP PULMONARY/RESPIRATORY EXAM: ICD-10-CM

## 2022-09-08 DIAGNOSIS — E66.01 MORBID OBESITY WITH BMI OF 45.0-49.9, ADULT (HCC): ICD-10-CM

## 2022-09-08 DIAGNOSIS — J96.11 CHRONIC HYPOXEMIC RESPIRATORY FAILURE (HCC): ICD-10-CM

## 2022-09-08 DIAGNOSIS — G47.33 OSA ON CPAP: ICD-10-CM

## 2022-09-08 DIAGNOSIS — Z23 NEED FOR VACCINATION: ICD-10-CM

## 2022-09-08 PROCEDURE — 90677 PCV20 VACCINE IM: CPT | Performed by: INTERNAL MEDICINE

## 2022-09-08 PROCEDURE — 90471 IMMUNIZATION ADMIN: CPT | Performed by: INTERNAL MEDICINE

## 2022-09-08 PROCEDURE — 99215 OFFICE O/P EST HI 40 MIN: CPT | Mod: 25 | Performed by: INTERNAL MEDICINE

## 2022-09-08 PROCEDURE — 94761 N-INVAS EAR/PLS OXIMETRY MLT: CPT | Performed by: INTERNAL MEDICINE

## 2022-09-08 RX ORDER — MEDROXYPROGESTERONE ACETATE 10 MG/1
20 TABLET ORAL 2 TIMES DAILY
COMMUNITY
Start: 2022-08-31

## 2022-09-08 RX ORDER — BENZONATATE 100 MG/1
100 CAPSULE ORAL 2 TIMES DAILY PRN
Qty: 60 CAPSULE | Refills: 5 | Status: SHIPPED | OUTPATIENT
Start: 2022-09-08

## 2022-09-08 RX ORDER — FERROUS SULFATE 325(65) MG
325 TABLET ORAL
COMMUNITY
Start: 2022-08-18

## 2022-09-08 ASSESSMENT — PATIENT HEALTH QUESTIONNAIRE - PHQ9
CLINICAL INTERPRETATION OF PHQ2 SCORE: 4
5. POOR APPETITE OR OVEREATING: 2 - MORE THAN HALF THE DAYS
SUM OF ALL RESPONSES TO PHQ QUESTIONS 1-9: 12

## 2022-09-08 NOTE — ASSESSMENT & PLAN NOTE
With the below evaluation and considerations in mind, the patient is optimized from a pulmonary perspective for a non-emergent, time-sensitive elective surgery.    Patient-specific risk factors for perioperative pulmonary complications:  1.  ASA class > 2  2.  History of obstructive sleep apnea  3.  FEV1 or peak expiratory flow less than 80% of predicted values of personal best    Procedure related risk factors for perioperative pulmonary complications:  1.  Surgery > 3 hours  2.  Use of general incision opposed to spinal/epidural    ARISCAT index:   35 (intermediate risk of perioperative pulm complications)    Recommendations to minimize perioperative pulmonary complications:  1.  Stress dose steroids intraoperatively  2.  Preoperative chest physical therapy including aerobic exercises breathing exercises and inspiratory muscle training reduces postoperative pulmonary complications.  Lung expansion maneuvers including incentive spirometry at this time prior to surgery.  3.  Regional/Neuraxial/spinal anesthesia as opposed to general anesthesia if possible  4.  Avoid long acting neuromuscular blockade intraoperatively if possible  5.  Minimize length of surgery as much as possible  6.  Encourage lung protective ventilation intraoperatively, goal tidal volumes of 6-8cc/kg IBW.  7.  Encourage incentive spirometry and voluntary deep breaths and early mobilization when deemed safe from a surgical perspective.  8.  Encourage initiation of pharmacologic thromboembolic prophylaxis postoperatively when deemed safe from a surgical perspective.

## 2022-09-08 NOTE — PROGRESS NOTES
Pulmonary Clinic Note    Chief Complaint:  Chief Complaint   Patient presents with    Follow-Up     ILD. Last seen 06/16/21     HPI:   Lara Alexander is a very pleasant 42 y.o. female never tobacco smoker who returns to the pulmonary clinic for follow-up of suspected organizing pneumonia/unclassified ILD, possible NSIP associated with Sjogren's syndrome.  Initial consultation in 12/2020, previously under the care of pulmonologist Dr Plasencia at [facility]     Lara symptoms began in 2015 when she developed an allergic reaction for which she was seen by an allergist.  She underwent skin testing which was negative and was diagnosed with asthma and treated with inhalers with no symptomatic improvement.  She had a dry cough for about 3 years which was stable, then about 2 years ago the cough became more vigorous and refractory and would be so severe that it would bring her to the point of gagging and throwing up.  Cough is minimally productive primarily of clear phlegm.  She established care with Dr. Plasencia and underwent a surgical lung biopsy in 2019, reportedly showing a pattern suggestive of UIP with fibroblastic foci and eosinophilia, however records also report uniform cellular expansion of interstitium suggestive of NSIP/OP.  She subsequently underwent an extensive serologic evaluation and was referred to [place] pulmonologist Dr. Ji Short, as well as establish care with rheumatologist Dr. Yang here in [place].  Multidisciplinary discussion at [place] concluded diagnosis of undifferentiated CTD-related ILD, based on a positive SSA but subsequent negative lip biopsy for Sjogrens.     CT chest 5/2020: multiple small nodular areas of consolidation and GGO in random distribution throughout both lungs with no honeycombing cystic changes of bronchiectasis.  No mediastinal or hilar lymphadenopathy.  No interval change since 12/2019.     CT chest 5/2021 at [place] shows stable/unchanged parenchymal disease.    PFTs    8/2020: FVC 2.90 L, 65% predicted, FEV1 2.27 L, 63% predicted, ratio 78%, TLC 4.11 L, 70% predicted, DLCO 80% predicted  1/2021: FVC 2.85 L, 67% predicted, FEV1 1.72 L, 49% predicted, ratio 60%, TLC 4.82 L, 85% predicted, DLCO 102% predicted  6/2021: FVC 2.88 L, 68% predicted, FEV1 1.52 L, 44% predicted, ratio 53%, TLC 4.88 L, 86% predicted, DLCO 94% predicted  5/2022: FVC 2.76L, 65% predicted, FEV1 1.45L, 42% predicted    6MWT:  1/2021: 1320 ft with desaturations to 88%.  6/2021: 1080ft with desats to 89%.   5/2022: 1320ft with desats to 85%    Interval events since last visit 6/2021:  No new imaging since last visit  Establishing care at [facility] next month for ILD, as prior specialist at [place] left  Had COVID in 4/2022, oddly felt BETTER afterward, probably due to high dose steroids  Recently diagnosed with endometrial cancer, establishing with [facility] in [place] with Dr Walter Frazier; started on progesterone for vaginal bleeding and planned for hysterectomy/BSO  Wt stable @ 304#    Regimen:  MMF 2500mg  Pred 8mg  Symbicort 160    Overall feeling better, less dyspneic which she correlates with the prednisone taper. Less dyspneic during the day, cough improving. Still short of breath after about 1/4 mile. Plans to start swimming laps at local pool. 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    Not using CPAP due to Levi device recall. Missed appt with [facility], needs to reestablish    MMRC Grade: 2: Walks slower than friends due to breathlessness, has to stop at own pace.     Past Medical History:   Diagnosis Date    Asthma     Back pain     Back pain     Chest pain     Chest tightness     Chickenpox     Chills     Cough     Depression     Earache     Fatigue     Frequent headaches     Frequent urination     GERD (gastroesophageal reflux disease)     Heartburn     Hyperlipidemia     Hypertension     Hypothyroidism     Insomnia     Irregular periods      Morning headache     Nasal drainage     Nausea     Obesity     Painful joint     Pneumonia     Restless leg syndrome     Rhinitis     Shortness of breath     Sleep apnea     Sore muscles     Sputum production     Sweat, sweating, excessive     Swelling of lower extremity     Tonsillitis     Weakness     Wears glasses     Wheezing        Past Surgical History:   Procedure Laterality Date    BRONCHOSCOPY      LUNG BIOPSY OPEN      TONSILLECTOMY         Social History     Socioeconomic History    Marital status: Single     Spouse name: Not on file    Number of children: Not on file    Years of education: Not on file    Highest education level: Not on file   Occupational History    Not on file   Tobacco Use    Smoking status: Never    Smokeless tobacco: Never   Vaping Use    Vaping Use: Never used   Substance and Sexual Activity    Alcohol use: Not Currently     Comment: Maybe have a drink or two a year    Drug use: No    Sexual activity: Not on file   Other Topics Concern    Not on file   Social History Narrative    Not on file     Social Determinants of Health     Financial Resource Strain: Not on file   Food Insecurity: Not on file   Transportation Needs: Not on file   Physical Activity: Not on file   Stress: Not on file   Social Connections: Not on file   Intimate Partner Violence: Not on file   Housing Stability: Not on file          Family History   Problem Relation Age of Onset    Cancer Maternal Grandfather     Breast Cancer Paternal Grandmother     Stroke Paternal Grandfather        Current Outpatient Medications on File Prior to Visit   Medication Sig Dispense Refill    FEROSUL 325 (65 Fe) MG tablet Take 325 mg by mouth 3 times a day with meals.      medroxyPROGESTERone (PROVERA) 10 MG Tab Take 20 mg by mouth 2 times a day.      montelukast (SINGULAIR) 10 MG Tab take 1 tablet by mouth once daily 30 Tablet 11    SYMBICORT 160-4.5 MCG/ACT Aerosol Inhale 2 Puffs 2 times a day.      omeprazole (PRILOSEC) 20 MG  delayed-release capsule Take 1 capsule by mouth 2 times a day. 30 capsule 11    venlafaxine XR (EFFEXOR XR) 37.5 MG CAPSULE SR 24 HR Take 75 mg by mouth every day.      prednisONE (DELTASONE) 2.5 MG Tab Take 8 mg by mouth every day.      ibuprofen (MOTRIN) 800 MG Tab Take 800 mg by mouth 3 times a day as needed.      Cholecalciferol 93334 UNIT Cap 5,000 Units every day.      Hydrocod Polst-CPM Polst ER (TUSSIONEX) 10-8 MG/5ML Suspension Extended Release Take 5 mL by mouth every 12 hours as needed.      mycophenolate (CELLCEPT) 500 MG tablet Take 1,250 mg by mouth 2 times a day.      methocarbamol (ROBAXIN) 750 MG Tab Take 750 mg by mouth 3 times a day as needed.      HYDROcodone-acetaminophen (NORCO) 7.5-325 MG per tablet Take 2 Tabs by mouth every 6 hours as needed.      hydroCHLOROthiazide (HYDRODIURIL) 25 MG Tab Take 25 mg by mouth every day.      albuterol (VENTOLIN OR PROVENTIL) 108 (90 BASE) MCG/ACT AERS Inhale 2 Puffs by mouth every 6 hours as needed for Shortness of Breath. 1 Inhaler 0    Ferrous Sulfate (IRON PO) Take  by mouth every 48 hours. (Patient not taking: Reported on 9/8/2022)       No current facility-administered medications on file prior to visit.     Allergies: Patient has no known allergies.    ROS:   Review of Systems   Constitutional:  Positive for malaise/fatigue. Negative for chills, fever and weight loss.        Vaginal bleeding   HENT:  Negative for congestion, sinus pain and sore throat.    Eyes:  Negative for pain and discharge.   Respiratory:  Positive for shortness of breath ( improving) and wheezing ( improving). Negative for cough, hemoptysis, sputum production and stridor.    Cardiovascular:  Negative for chest pain, orthopnea and leg swelling.   Gastrointestinal:  Negative for abdominal pain, diarrhea, nausea and vomiting.   Genitourinary:  Negative for dysuria, frequency and urgency.   Musculoskeletal:  Negative for myalgias.   Skin:  Negative for rash.   Neurological:  Negative  "for dizziness, sensory change, focal weakness, loss of consciousness and headaches.   Psychiatric/Behavioral: Negative.     All other systems reviewed and are negative.  Vitals:  /68 (BP Location: Left arm, Patient Position: Sitting, BP Cuff Size: Large adult)   Pulse 86   Ht 1.727 m (5' 8\")   Wt (!) 138 kg (304 lb)   SpO2 93%     Physical Exam:  Physical Exam  Vitals and nursing note reviewed.   Constitutional:       General: She is not in acute distress.     Appearance: Normal appearance. She is well-developed. She is obese. She is not ill-appearing or diaphoretic.      Comments: Very pleasant   Eyes:      General: No scleral icterus.        Right eye: No discharge.         Left eye: No discharge.      Conjunctiva/sclera: Conjunctivae normal.      Pupils: Pupils are equal, round, and reactive to light.   Neck:      Thyroid: No thyromegaly.      Vascular: No JVD.   Cardiovascular:      Rate and Rhythm: Normal rate and regular rhythm.      Heart sounds: Normal heart sounds. No murmur heard.    No gallop.   Pulmonary:      Effort: Pulmonary effort is normal. No respiratory distress.      Breath sounds: Rales (scattered distant) present. No wheezing.   Abdominal:      General: There is no distension.      Palpations: Abdomen is soft.      Tenderness: There is no abdominal tenderness. There is no guarding.      Comments: Globus   Genitourinary:     Comments: Deferred  Musculoskeletal:         General: No tenderness.      Right lower leg: No edema.      Left lower leg: No edema.   Lymphadenopathy:      Cervical: No cervical adenopathy.   Skin:     General: Skin is warm and dry.      Capillary Refill: Capillary refill takes less than 2 seconds.      Coloration: Skin is not pale.      Findings: No bruising, erythema or rash.   Neurological:      Mental Status: She is alert and oriented to person, place, and time.      Cranial Nerves: No cranial nerve deficit.      Sensory: No sensory deficit.      Motor: No " abnormal muscle tone.   Psychiatric:         Mood and Affect: Mood normal.         Behavior: Behavior normal.         Thought Content: Thought content normal.         Judgment: Judgment normal.     Laboratory Data:    PFTs as reviewed by me personally show:  8/2020: FVC 2.90 L, 65% predicted, FEV1 2.27 L, 63% predicted, ratio 78%, TLC 4.11 L, 70% predicted, DLCO 80% predicted  1/2021: FVC 2.85 L, 67% predicted, FEV1 1.72 L, 49% predicted, ratio 60%, TLC 4.82 L, 85% predicted, DLCO 102% predicted  6/2021: FVC 2.88 L, 68% predicted, FEV1 1.52 L, 44% predicted, ratio 53%, TLC 4.88 L, 86% predicted, DLCO 94% predicted    6MWT:  1/2021: 1320 ft with desaturations to 88%.  6/2021: 1080ft with desats to 89%.       Imaging as reviewed by me personally show:    CT chest 5/2020: multiple small nodular areas of consolidation and GGO in random distribution throughout both lungs with no honeycombing cystic changes of bronchiectasis.  No mediastinal or hilar lymphadenopathy.  No interval change since 12/2019.    CT chest 5/2021 at [place] shows stable/unchanged parenchymal disease    Assessment/Plan:    Problem List Items Addressed This Visit       Preop pulmonary/respiratory exam     With the below evaluation and considerations in mind, the patient is optimized from a pulmonary perspective for a non-emergent, time-sensitive elective surgery.    Patient-specific risk factors for perioperative pulmonary complications:  1.  ASA class > 2  2.  History of obstructive sleep apnea  3.  FEV1 or peak expiratory flow less than 80% of predicted values of personal best    Procedure related risk factors for perioperative pulmonary complications:  1.  Surgery > 3 hours  2.  Use of general incision opposed to spinal/epidural    ARISCAT index:   35 (intermediate risk of perioperative pulm complications)    Recommendations to minimize perioperative pulmonary complications:  1.  Stress dose steroids intraoperatively  2.  Preoperative chest  physical therapy including aerobic exercises breathing exercises and inspiratory muscle training reduces postoperative pulmonary complications.  Lung expansion maneuvers including incentive spirometry at this time prior to surgery.  3.  Regional/Neuraxial/spinal anesthesia as opposed to general anesthesia if possible  4.  Avoid long acting neuromuscular blockade intraoperatively if possible  5.  Minimize length of surgery as much as possible  6.  Encourage lung protective ventilation intraoperatively, goal tidal volumes of 6-8cc/kg IBW.  7.  Encourage incentive spirometry and voluntary deep breaths and early mobilization when deemed safe from a surgical perspective.  8.  Encourage initiation of pharmacologic thromboembolic prophylaxis postoperatively when deemed safe from a surgical perspective.         Interstitial lung disease (HCC)     Extensive prior work-up for interstitial lung disease at [facility] at [place] including surgical lung biopsy in 2019, working diagnosis of NSIP/OP related to underlying undifferentiated connective tissue disease, possible NSIP associated with Sjogren's syndrome.  SSA positive, however lip bx negative for Sjogren's findings.     No O2- multi ox 2/201 on room air no desaturations.  mMRC 2/ NYHA II.     Plan:  - PFTs and 6MWT stable. MMF 2.5 g/day, prednisone 8 mg p.o. daily per [facility] recommendations. Transferring care to [facility]. Subjectively better AFTER COVID  - increased dyspnea recently likely 2/2 SYMONE from vaginal bleeding  - Multiox today + desats -> Rx O2  - Cont Symbicort 160  - pulmonary rehab not realistic as she has exhausted her vacation/sick leave.   - [facility] ILD- 10/20/2022; [facility] Rheum pending; [facility] Gyn Onc next week.  - PCV13: 6/2020; influenza 9/2020; COVID 4/2021         Relevant Orders    Multiple Oximetry    DME O2 New Set Up    OLIVIA on CPAP     Currently off CPAP given Levi recall  Referral placed today to sleep clinic for f/u         Class 3  severe obesity with serious comorbidity and body mass index (BMI) of 45.0 to 49.9 in adult (HCC)     Weight stable  Counseled about the need for weight loss for long-term health risk reduction  If lung transplant is a necessity in the future will also need to get weight down         Iron deficiency anemia due to chronic blood loss     From endometrial CA  Compounded by AoCD  Followed by Dr Tan         Morbid obesity with BMI of 45.0-49.9, adult (HCC)    Chronic hypoxemic respiratory failure (HCC)     Desat with ambulation today on room air multiox  Rx DME New O2 set up   Instructed to titrate O2 to maintain sats low 90s         Endometrial cancer (HCC)     Pending eval between Dr Walter Frazier or [facility] gyn onc  Patient optimized from a pulmonary perspective          Other Visit Diagnoses       Need for vaccination        Relevant Orders    Pneumococcal Conjugate Vaccine 20-Valent (19 yrs+) (Completed)          Return in about 2 months (around 11/8/2022).     This note was generated using voice recognition software which has a chance of producing errors of grammar and possibly content.  I have made every reasonable attempt to find and correct any obvious errors, but it should be expected that some may not be found prior to finalization of this note.    Time spent in record review prior to patient arrival, reviewing results, and in face-to-face encounter totaled 54 min, excluding any procedures if performed.  __________  Malick Young MD  Pulmonary and Critical Care Medicine  [facility]

## 2022-09-08 NOTE — ASSESSMENT & PLAN NOTE
Weight stable  Counseled about the need for weight loss for long-term health risk reduction  If lung transplant is a necessity in the future will also need to get weight down

## 2022-09-08 NOTE — PROCEDURES
Multi-Ox Readings  Multi Ox #1 Room air   O2 sat % at rest 96 (Pulse: 86)   O2 sat % on exertion 88 (Pulse: 132)   O2 sat average on exertion     Multi Ox #2 2 LPM   O2 sat % at rest 98 (pulse: 77)   O2 sat % on exertion 94 (pulse: 128)   O2 sat average on exertion       Oxygen Use     Oxygen Frequency     Duration of need     Is the patient mobile within the home?     CPAP Use?     BIPAP Use?     Servo Titration

## 2022-09-08 NOTE — ASSESSMENT & PLAN NOTE
Extensive prior work-up for interstitial lung disease at [place] [place] at [place] including surgical lung biopsy in 2019, working diagnosis of NSIP/OP related to underlying undifferentiated connective tissue disease, possible NSIP associated with Sjogren's syndrome.  SSA positive, however lip bx negative for Sjogren's findings.     No O2- multi ox 2/201 on room air no desaturations.  mMRC 2/ NYHA II.     Plan:  - PFTs and 6MWT stable. MMF 2.5 g/day, prednisone 8 mg p.o. daily per [place] [place] recommendations. Transferring care to [facility]. Subjectively better AFTER COVID  - increased dyspnea recently likely 2/2 SYMONE from vaginal bleeding  - Multiox today + desats -> Rx O2  - Cont Symbicort 160  - pulmonary rehab not realistic as she has exhausted her vacation/sick leave.   - [facility] ILD- 10/20/2022; [facility] Rheum pending; [facility] Gyn Onc next week.  - PCV13: 6/2020; influenza 9/2020; COVID 4/2021

## 2022-09-08 NOTE — ASSESSMENT & PLAN NOTE
Pending eval between Dr Walter Frazier or [facility] gyn onc  Patient optimized from a pulmonary perspective

## 2022-09-08 NOTE — ASSESSMENT & PLAN NOTE
Desat with ambulation today on room air multiox  Rx DME New O2 set up   Instructed to titrate O2 to maintain sats low 90s

## 2022-12-12 ENCOUNTER — PATIENT MESSAGE (OUTPATIENT)
Dept: SLEEP MEDICINE | Facility: MEDICAL CENTER | Age: 42
End: 2022-12-12
Payer: COMMERCIAL

## 2022-12-13 ENCOUNTER — PATIENT MESSAGE (OUTPATIENT)
Dept: SLEEP MEDICINE | Facility: MEDICAL CENTER | Age: 42
End: 2022-12-13
Payer: COMMERCIAL

## 2022-12-13 DIAGNOSIS — J84.9 INTERSTITIAL LUNG DISEASE (HCC): ICD-10-CM

## 2022-12-14 ENCOUNTER — TELEMEDICINE (OUTPATIENT)
Dept: SLEEP MEDICINE | Facility: MEDICAL CENTER | Age: 42
End: 2022-12-14
Payer: COMMERCIAL

## 2022-12-14 VITALS
SYSTOLIC BLOOD PRESSURE: 129 MMHG | HEIGHT: 68 IN | WEIGHT: 293 LBS | DIASTOLIC BLOOD PRESSURE: 83 MMHG | BODY MASS INDEX: 44.41 KG/M2 | HEART RATE: 75 BPM

## 2022-12-14 DIAGNOSIS — C54.1 ENDOMETRIAL CANCER (HCC): ICD-10-CM

## 2022-12-14 DIAGNOSIS — G47.33 OSA ON CPAP: ICD-10-CM

## 2022-12-14 DIAGNOSIS — J96.11 CHRONIC HYPOXEMIC RESPIRATORY FAILURE (HCC): ICD-10-CM

## 2022-12-14 DIAGNOSIS — E66.01 MORBID OBESITY WITH BMI OF 45.0-49.9, ADULT (HCC): ICD-10-CM

## 2022-12-14 DIAGNOSIS — J84.9 INTERSTITIAL LUNG DISEASE (HCC): ICD-10-CM

## 2022-12-14 PROCEDURE — 99215 OFFICE O/P EST HI 40 MIN: CPT | Performed by: INTERNAL MEDICINE

## 2022-12-14 NOTE — ASSESSMENT & PLAN NOTE
Extensive prior work-up for interstitial lung disease at [place] at [place] including surgical lung biopsy in 2019, working diagnosis of NSIP/OP related to underlying undifferentiated connective tissue disease, possible NSIP associated with Sjogren's syndrome.  SSA positive, however lip bx negative for Sjogren's findings.     2 LPM with exertion.  mMRC 2/ NYHA II.     Plan:  - Establishing care with [facility] ILD clinic early 2023.  Repeat PFTs prior to visit.  Continue MMF 2.5 g/day, prednisone 8 mg p.o. daily per [facility] recommendations.   - Cont Symbicort 160  - pulmonary rehab not realistic as she has exhausted her vacation/sick leave.   - [facility] ILD- pending; [facility] Rheum pending; [facility] Gyn Onc scheduled

## 2022-12-14 NOTE — PROGRESS NOTES
Telemedicine Video Visit: Established Patient   This visit was conducted via Zoom using secure and encrypted videoconferencing technology. The patient was in a private location in the [facility]. The patient's identity was confirmed and verbal consent was obtained for this virtual visit. Given the importance of social distancing and other strategies recommended to reduce the risk of COVID-19 transmission, I am providing medical care to this patient via audio/video visit in place of an in person visit at the request of the patient. Verbal consent to telehealth, risks, benefits, and consequences were discussed. Patient retains the right to withdraw at any time. All existing confidentiality protections apply. The patient has access to all transmitted medical information. No dissemination of any patient images or information to other entities without further written consent.    Place of Service: Home    Subjective:     Chief Complaint   Patient presents with    Follow-Up     ILD. Last seen 09/08/22     Lara Alexander is a very pleasant 42 y.o. female never tobacco smoker who returns to the pulmonary clinic for follow-up of suspected organizing pneumonia/unclassified ILD, possible NSIP associated with Sjogren's syndrome.  Initial consultation in 12/2020, previously under the care of pulmonologist Dr Plasencia at [place].     Lara symptoms began in 2015 when she developed an allergic reaction for which she was seen by an allergist.  She underwent skin testing which was negative and was diagnosed with asthma and treated with inhalers with no symptomatic improvement.  She had a dry cough for about 3 years which was stable, then about 2 years ago the cough became more vigorous and refractory and would be so severe that it would bring her to the point of gagging and throwing up.  Cough is minimally productive primarily of clear phlegm.  She established care with Dr. Plasencia and underwent a surgical lung biopsy in 2019,  reportedly showing a pattern suggestive of UIP with fibroblastic foci and eosinophilia, however records also report uniform cellular expansion of interstitium suggestive of NSIP/OP.  She subsequently underwent an extensive serologic evaluation and was referred to [facility] pulmonologist Dr. Ji Shrot, as well as establish care with rheumatologist Dr. Celia salazar in [place].  Multidisciplinary discussion at [facility] concluded diagnosis of undifferentiated CTD-related ILD, based on a positive SSA but subsequent negative lip biopsy for Sjogrens.     CT chest 5/2020: multiple small nodular areas of consolidation and GGO in random distribution throughout both lungs with no honeycombing cystic changes of bronchiectasis.  No mediastinal or hilar lymphadenopathy.  No interval change since 12/2019.     CT chest 5/2021 at [facility] shows stable/unchanged parenchymal disease.     PFTs   8/2020: FVC 2.90 L, 65% predicted, FEV1 2.27 L, 63% predicted, ratio 78%, TLC 4.11 L, 70% predicted, DLCO 80% predicted  1/2021: FVC 2.85 L, 67% predicted, FEV1 1.72 L, 49% predicted, ratio 60%, TLC 4.82 L, 85% predicted, DLCO 102% predicted  6/2021: FVC 2.88 L, 68% predicted, FEV1 1.52 L, 44% predicted, ratio 53%, TLC 4.88 L, 86% predicted, DLCO 94% predicted  5/2022: FVC 2.76L, 65% predicted, FEV1 1.45L, 42% predicted     6MWT:  1/2021: 1320 ft with desaturations to 88%.  6/2021: 1080ft with desats to 89%.   5/2022: 1320ft with desats to 85%     Interval events since last visit 9/2022:  Underwent HUBER/BSO for endometrial cancer in 10/2022 at [facility].  Surgery went well.  Back to work.  Her consultation with [facility] ILD clinic was postponed due to surgery.  Plan for early 2023.  We prescribed her supplemental oxygen on her last visit, unfortunately her DME provider where she is located does not have any available portable oxygen concentrator's.  So she uses supplemental oxygen with exertion at home only as carrying tanks is too heavy for her to  tolerate at work.  Upcoming sleep evaluation at [facility] in 3/2023.    Symptomatically, she is recovering from a recent URI likely the flu with sinus congestion, fatigue, nausea, fever and wheezing.    Regimen:  MMF 2500mg  Pred 8mg  Symbicort 160     ROS   Recovering from recent URI.  See HPI.    No Known Allergies    Current medicines (including changes today)  Current Outpatient Medications   Medication Sig Dispense Refill    FEROSUL 325 (65 Fe) MG tablet Take 325 mg by mouth 3 times a day with meals.      benzonatate (TESSALON) 100 MG Cap Take 1 Capsule by mouth 2 times a day as needed for Cough. 60 Capsule 5    montelukast (SINGULAIR) 10 MG Tab take 1 tablet by mouth once daily 30 Tablet 11    SYMBICORT 160-4.5 MCG/ACT Aerosol Inhale 2 Puffs 2 times a day.      omeprazole (PRILOSEC) 20 MG delayed-release capsule Take 1 capsule by mouth 2 times a day. 30 capsule 11    Ferrous Sulfate (IRON PO) Take  by mouth every 48 hours.      venlafaxine XR (EFFEXOR XR) 37.5 MG CAPSULE SR 24 HR Take 75 mg by mouth every day.      prednisONE (DELTASONE) 2.5 MG Tab Take 8 mg by mouth every day.      ibuprofen (MOTRIN) 800 MG Tab Take 800 mg by mouth 3 times a day as needed.      Cholecalciferol 54990 UNIT Cap 5,000 Units every day.      Hydrocod Polst-CPM Polst ER (TUSSIONEX) 10-8 MG/5ML Suspension Extended Release Take 5 mL by mouth every 12 hours as needed.      mycophenolate (CELLCEPT) 500 MG tablet Take 1,250 mg by mouth 2 times a day.      methocarbamol (ROBAXIN) 750 MG Tab Take 750 mg by mouth 3 times a day as needed.      HYDROcodone-acetaminophen (NORCO) 7.5-325 MG per tablet Take 2 Tabs by mouth every 6 hours as needed.      hydroCHLOROthiazide (HYDRODIURIL) 25 MG Tab Take 25 mg by mouth every day.      albuterol (VENTOLIN OR PROVENTIL) 108 (90 BASE) MCG/ACT AERS Inhale 2 Puffs by mouth every 6 hours as needed for Shortness of Breath. 1 Inhaler 0    medroxyPROGESTERone (PROVERA) 10 MG Tab Take 20 mg by mouth 2 times a  "day.       No current facility-administered medications for this visit.       Patient Active Problem List    Diagnosis Date Noted    Preop pulmonary/respiratory exam 09/08/2022     Priority: High    Morbid obesity with BMI of 45.0-49.9, adult ([facility]) 09/08/2022    Chronic hypoxemic respiratory failure ([facility]) 09/08/2022    Endometrial cancer ([facility]) 09/08/2022    Interstitial lung disease ([facility]) 12/04/2020    OLIVIA on CPAP 12/04/2020    Class 3 severe obesity with serious comorbidity and body mass index (BMI) of 45.0 to 49.9 in adult ([facility]) 12/04/2020    Iron deficiency anemia due to chronic blood loss 12/04/2020       Family History   Problem Relation Age of Onset    Cancer Maternal Grandfather     Breast Cancer Paternal Grandmother     Stroke Paternal Grandfather        She  has a past medical history of Asthma, Back pain, Back pain, Chest pain, Chest tightness, Chickenpox, Chills, Cough, Depression, Earache, Fatigue, Frequent headaches, Frequent urination, GERD (gastroesophageal reflux disease), Heartburn, Hyperlipidemia, Hypertension, Hypothyroidism, Insomnia, Irregular periods, Morning headache, Nasal drainage, Nausea, Obesity, Painful joint, Pneumonia, Restless leg syndrome, Rhinitis, Shortness of breath, Sleep apnea, Sore muscles, Sputum production, Sweat, sweating, excessive, Swelling of lower extremity, Tonsillitis, Weakness, Wears glasses, and Wheezing.    She has no past medical history of ASTHMA or Diabetes.  She  has a past surgical history that includes bronchoscopy; lung biopsy open; and tonsillectomy.       Objective:   Vitals obtained by patient:  /83 (BP Location: Left arm, Patient Position: Sitting, BP Cuff Size: Large adult) Comment: per pt  Pulse 75 Comment: per pt  Ht 1.727 m (5' 8\") Comment: per pt  Wt (!) 135 kg (298 lb) Comment: per pt  BMI 45.31 kg/m²     Physical Exam:  Constitutional: Alert, no distress, well-groomed.  Skin: No rashes in visible areas.  Eye: Round. Conjunctiva clear, lids " normal. No icterus.   ENMT: Lips pink without lesions, good dentition, moist mucous membranes. Phonation normal.  Neck: No masses, no thyromegaly. Moves freely without pain.  CV: Pulse as reported by patient  Respiratory: Unlabored respiratory effort, no cough or audible wheeze  Psych: Alert and oriented x3, normal affect and mood.       Assessment and Plan:   The following treatment plan was discussed:     Problem List Items Addressed This Visit       Interstitial lung disease (HCC)     Extensive prior work-up for interstitial lung disease at [facility] [place] including surgical lung biopsy in 2019, working diagnosis of NSIP/OP related to underlying undifferentiated connective tissue disease, possible NSIP associated with Sjogren's syndrome.  SSA positive, however lip bx negative for Sjogren's findings.     2 LPM with exertion.  mMRC 2/ NYHA II.     Plan:  - Establishing care with [facility] ILD clinic early 2023.  Repeat PFTs prior to visit.  Continue MMF 2.5 g/day, prednisone 8 mg p.o. daily per [facility] recommendations.   - Cont Symbicort 160  - pulmonary rehab not realistic as she has exhausted her vacation/sick leave.   - [facility] ILD- pending; [facility] Rheum pending; [facility] Gyn Onc scheduled         Relevant Orders    PULMONARY FUNCTION TESTS -Test requested: Complete Pulmonary Function Test    OLIVIA on CPAP     Currently off CPAP given Levi recall  Consultation with [facility] sleep in 3/2023         Morbid obesity with BMI of 45.0-49.9, adult (HCC)     Weight stable  Counseled about the need for weight loss for long-term health risk reduction  If lung transplant is a necessity in the future will also need to get weight down         Chronic hypoxemic respiratory failure (HCC)     Continue 2 LPM with exertion  DME provider does not have POC available, which precludes her from using supplemental oxygen at work  Cont to titrate O2 to maintain sats low 90s         Endometrial cancer (HCC)     S/p HUBER/BSO   Vishal 10/2022          Follow-up: Return in about 3 months (around 3/7/2023).    Face to Face Video Visit:   I spent 44 minutes with patient/guardian and I conducted this visit with audio and video present.    This note was generated using voice recognition software which has a chance of producing errors of grammar and content.  I have made every reasonable attempt to find and correct any errors, but it should be expected that some may not be found prior to finalization of this note.  __________  Malick Young MD  Pulmonary and Critical Care Medicine  [facility]   Minoxidil Pregnancy And Lactation Text: This medication has not been assigned a Pregnancy Risk Category but animal studies failed to show danger with the topical medication. It is unknown if the medication is excreted in breast milk.

## 2022-12-14 NOTE — ASSESSMENT & PLAN NOTE
Continue 2 LPM with exertion  DME provider does not have POC available, which precludes her from using supplemental oxygen at work  Cont to titrate O2 to maintain sats low 90s

## 2023-01-18 ENCOUNTER — PATIENT MESSAGE (OUTPATIENT)
Dept: SLEEP MEDICINE | Facility: MEDICAL CENTER | Age: 43
End: 2023-01-18
Payer: COMMERCIAL

## 2023-02-01 ENCOUNTER — PATIENT MESSAGE (OUTPATIENT)
Dept: SLEEP MEDICINE | Facility: MEDICAL CENTER | Age: 43
End: 2023-02-01
Payer: COMMERCIAL

## 2023-02-01 DIAGNOSIS — Z51.81 MEDICATION MONITORING ENCOUNTER: ICD-10-CM

## 2023-02-01 DIAGNOSIS — J84.9 INTERSTITIAL LUNG DISEASE (HCC): ICD-10-CM

## 2023-02-06 RX ORDER — PREDNISONE 1 MG/1
TABLET ORAL
Qty: 90 TABLET | Refills: 5 | Status: SHIPPED | OUTPATIENT
Start: 2023-02-06

## 2023-02-06 RX ORDER — PREDNISONE 5 MG/1
TABLET ORAL
Qty: 30 TABLET | Refills: 5 | Status: SHIPPED | OUTPATIENT
Start: 2023-02-06 | End: 2023-11-29

## 2023-02-06 NOTE — TELEPHONE ENCOUNTER
Have we ever prescribed this med? Yes.  If yes, what date? 02/01/23    Last OV: 12/14/22 with Dr Young    Next OV: No Pending appt.     DX:     Medications:   Requested Prescriptions     Pending Prescriptions Disp Refills    predniSONE (DELTASONE) 5 MG Tab [Pharmacy Med Name: PREDNISONE 5 MG TABLET] 30 Tablet      Sig: take 1 tablet by mouth once daily    predniSONE (DELTASONE) 1 MG Tab [Pharmacy Med Name: PREDNISONE 1 MG TABLET] 90 Tablet      Sig: take 3 tablet by mouth once daily

## 2023-02-08 DIAGNOSIS — G47.33 OSA ON CPAP: ICD-10-CM

## 2023-03-02 ENCOUNTER — NON-PROVIDER VISIT (OUTPATIENT)
Dept: SLEEP MEDICINE | Facility: MEDICAL CENTER | Age: 43
End: 2023-03-02
Attending: INTERNAL MEDICINE
Payer: COMMERCIAL

## 2023-03-02 VITALS — HEIGHT: 68 IN | BODY MASS INDEX: 44.41 KG/M2 | WEIGHT: 293 LBS

## 2023-03-02 DIAGNOSIS — J84.9 INTERSTITIAL LUNG DISEASE (HCC): ICD-10-CM

## 2023-03-02 PROCEDURE — 94060 EVALUATION OF WHEEZING: CPT | Performed by: INTERNAL MEDICINE

## 2023-03-02 PROCEDURE — 94726 PLETHYSMOGRAPHY LUNG VOLUMES: CPT | Performed by: INTERNAL MEDICINE

## 2023-03-02 PROCEDURE — 94729 DIFFUSING CAPACITY: CPT | Performed by: INTERNAL MEDICINE

## 2023-03-02 PROCEDURE — 94618 PULMONARY STRESS TESTING: CPT | Mod: 26 | Performed by: INTERNAL MEDICINE

## 2023-03-02 PROCEDURE — 94726 PLETHYSMOGRAPHY LUNG VOLUMES: CPT | Mod: 26 | Performed by: INTERNAL MEDICINE

## 2023-03-02 PROCEDURE — 94060 EVALUATION OF WHEEZING: CPT | Mod: 26 | Performed by: INTERNAL MEDICINE

## 2023-03-02 PROCEDURE — 94729 DIFFUSING CAPACITY: CPT | Mod: 26 | Performed by: INTERNAL MEDICINE

## 2023-03-02 ASSESSMENT — 6 MINUTE WALK TEST (6MWT)
HEART RATE AT 5 MIN: 134
COMMENTS: TEST ON ROOM AIR.
TOTAL REST TIME: 0
PERCEIVED BREATHLESSNESS AT 1 MIN: 0.5
BLOOD PRESSURE AT 1 MIN: 128/80
PERCEIVED FATIGUE AT 2 MIN: 0
PERCEIVED FATIGUE AT 3 MIN: 0
HEART RATE AT 2 MIN: 120
HEART RATE AT 2 MIN: 116
NUMBER OF RESTS: 0
PERCEIVED BREATHLESSNESS AT 3 MIN: 1
SAO2 AT 2 MIN: 92
HEART RATE: 74
HEART RATE AT 4 MIN: 130
PERCENT OF NORMAL WALKED: 70
SAO2 AT 5 MIN: 89
SAO2 AT 3 MIN: 90
PERCEIVED BREATHLESSNESS AT 2 MIN: 1
SAO2 AT 1 MIN: 97
PERCEIVED FATIGUE AT 5 MIN: 0
SAO2 AT 1 MIN: 90
PERCEIVED BREATHLESSNESS AT 1 MIN: 1
BLOOD PRESSURE: RIGHT ARM
O2 SAT PERCENT ROOM AIR: 98
HEART RATE AT 1 MIN: 118
SAO2 AT 4 MIN: 90
SAO2 AT 6 MIN: 89
PERCEIVED FATIGUE AT 4 MIN: 0
PERCEIVED FATIGUE AT 1 MIN: 0
SITTING BLOOD PRESSURE: 128/80
PERCEIVED BREATHLESSNESS AT 5 MIN: 1
PERCEIVED BREATHLESSNESS AT 2 MIN: 1
AMBULATES WITH O2: WITHOUT O2
PERCEIVED FATIGUE AT 6 MIN: 0
PERCEIVED FATIGUE AT 2 MIN: 0
HEART RATE AT 1 MIN: 132
HEART RATE AT 3 MIN: 133
PERCEIVED BREATHLESSNESS AT 6 MIN: 1
SAO2 AT 2 MIN: 92
PERCEIVED BREATHLESSNESS AT 4 MIN: 1
HEART RATE AT 6 MIN: 118

## 2023-03-02 ASSESSMENT — PULMONARY FUNCTION TESTS
FEV1_PREDICTED: 3.4
FEV1: 2.03
FEV1_PERCENT_CHANGE: 1
FVC: 3.03
FEV1_LLN: 2.84
FEV1/FVC: 56
FEV1/FVC: 67
FVC_PREDICTED: 4.2
FVC_PERCENT_PREDICTED: 70
FEV1_PERCENT_PREDICTED: 59
FEV1_PERCENT_PREDICTED: 49
FVC_PERCENT_PREDICTED: 72
FEV1/FVC_PERCENT_PREDICTED: 68
FEV1/FVC_PERCENT_PREDICTED: 82
FVC: 2.98
FEV1/FVC_PREDICTED: 82
FEV1/FVC_PERCENT_PREDICTED: 82
FEV1/FVC: 56
FEV1: 1.67
FEV1/FVC_PERCENT_LLN: 68
FEV1_PERCENT_CHANGE: 21
FVC_LLN: 3.51
FEV1/FVC_PERCENT_CHANGE: 2100
FEV1/FVC_PERCENT_CHANGE: 19
FEV1/FVC_PERCENT_PREDICTED: 81
FEV1/FVC_PERCENT_PREDICTED: 70
FEV1/FVC: 67

## 2023-03-02 NOTE — PROCEDURES
Technician: ANA Pinzon    Technician Comment:  Good patient effort & cooperation.  The results of this test meet the ATS/ERS standards for acceptability & reproducibility.  Test was performed on the [employer] Body Plethysmograph-Elite DX system.  Predicted values were GLI-2012 for spirometry, GLI-2017 for DLCO, ITS for Lung Volumes.  The DLCO was uncorrected for Hgb.  A bronchodilator of Ventolin HFA -2puffs via spacer administered.  DLCO performed during dilation period.    Interpretation:     The results of this test meet the criteria for acceptability and repeatability by the ATS.  SPIROMETRY:  There is evidence of obstruction in this test manifested by an FEV1/FVC ratio of 56%.  Noted that FEV1 was 59% ( 2.03 lt) of predicted on the post bronchodilator arm.  There was a significant response to bronchodilators in this test in the FEV1 branch, meeting criteria from ATS    LUNG VOLUMES:  The lung volumes are within normal limits, evidenced by a TLC of 102% ( 5.79 lt)  There is no hyperinflation but there is a mild air trapping, manifested by an RV of 134% and TLC as above.    DIFFUSION  There is no impairment of the diffusion capacity in this test manifested by a DLCO of 138%.     FLOW-VOLUME GRAPHICS/LOOPS:  The flow volume curves correlates with information above.    MIP/MEP:   Special testing was not performed.    CONCLUSION:   Moderate obstruction with a good response to bronchodilators and mild air trapping.  Patient should be encouraged to use bronchodilators.  Clinical and radiographic correlation is recommended    Ozzie Coelho MD, FACP  Pulmonary/Critical Care  ---------------------------------------------------------------------------------------------------------  I, Ozzie Coelho MD, FACP, is the author of this note (interpretation of PFT)

## 2023-03-02 NOTE — PROCEDURES
Test on Room Air    There is significant desaturation with ambulation from 98% at rest on room air to a malcom of 89% however no clear indication for supplemental oxygen.  Distance walked is 1080 feet or 70% predicted.

## 2023-03-07 ENCOUNTER — APPOINTMENT (OUTPATIENT)
Dept: SLEEP MEDICINE | Facility: MEDICAL CENTER | Age: 43
End: 2023-03-07
Attending: INTERNAL MEDICINE
Payer: COMMERCIAL

## 2023-03-13 ASSESSMENT — ENCOUNTER SYMPTOMS: SLEEP DISTURBANCE: 0

## 2023-03-13 NOTE — PROGRESS NOTES
"CC: Evaluation of OLIVIA  previously on CPAP      HPI:  Lara Alexander is a 42 y.o. 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 Malick Young for OLIVIA on CPAP.  Her previous [employer] machine was recalled approximately 2 years ago.  Her original study was done at [facility].    She briefly describes her sleep problem as \"problem falling asleep no matter how tired, wake up throughout the night, toss and turn, wake up coughing at times.\"  She developed these problems as a teenager.  She rates the severity as 3 out of 4.  Sometimes the severity is worse than this morning she experiences weakness, fatigue, brain fog, and difficulty concentrating.    She was originally diagnosed to have sleep apnea 2019 placed on CPAP but her machine was recalled and she is no longer using the CPAP.    He generally works 8 AM to 5 PM.  She may work on occasional Saturday.  He generally goes to bed 11 PM-2 AM and gets up between 6:45 and 7: But denies falling asleep accidentally.  15.  On weekends she sleeps from about.    She does not have a regular bed partner.  She estimates her sleep latency to be 30 minutes to 2 hours.  She may watch TV, talk to people on her phone, or talk to her daughter in person just prior to turning out the lights and attempting to go to sleep.  She experiences 5 nocturnal awakenings and reports 2-3 episodes of nocturia.    Symptoms include great difficulty awakening and getting out of bed after sleeping, napping or returning to bed after arising on nonwork days, sleepiness during the day, fatigue, but denies falling asleep accidentally.    She has been on nocturnal oxygen 2 L/min since October 2022.  She has been told that she snores when she is very tired.    She does report restless legs 2-3 times a week for 30 minutes to 2 hours.    Past medical history significant for obesity, chronic respiratory failure requiring domiciliary O2, status post HUBER/BSO for " endometrial cancer 10/22, never smoker, interstitial lung disease status post lung biopsy showing UIP with fibroblastic foci and eosinophilia but also uniform cellular expansion of the interstitium suggestive of NSIP/OP, and ongoing prednisone and  mycophenolate..  She was referred to [facility] and was evaluated by Dr. Ji Short MD and is seen Dr. Yang in [place] a rheumatologist.              Patient Active Problem List    Diagnosis Date Noted    Morbid obesity with BMI of 45.0-49.9, adult ([facility]) 09/08/2022    Chronic hypoxemic respiratory failure ([facility]) 09/08/2022    Endometrial cancer ([facility]) 09/08/2022    Preop pulmonary/respiratory exam 09/08/2022    Interstitial lung disease ([facility]) 12/04/2020    OLIVIA on CPAP 12/04/2020    Class 3 severe obesity with serious comorbidity and body mass index (BMI) of 45.0 to 49.9 in adult ([facility]) 12/04/2020    Iron deficiency anemia due to chronic blood loss 12/04/2020       Past Medical History:   Diagnosis Date    Asthma     Back pain     Back pain     Chest pain     Chest tightness     Chickenpox     Chills     Cough     Depression     Dizziness     Earache     Fatigue     Frequent headaches     Frequent urination     GERD (gastroesophageal reflux disease)     Heartburn     Hyperlipidemia     Hypertension     Hypothyroidism     Insomnia     Irregular periods     Morning headache     Nasal drainage     Nausea     Obesity     Painful joint     Pneumonia     Pulmonary nodule     Restless leg syndrome     Rhinitis     Shortness of breath     Sleep apnea     Sore muscles     Sputum production     Sweat, sweating, excessive     Swelling of lower extremity     Tonsillitis     Weakness     Wears glasses     Wheezing         Past Surgical History:   Procedure Laterality Date    BRONCHOSCOPY      HYSTERECTOMY LAPAROSCOPY      INTUBATION      LUNG BIOPSY OPEN      TONSILLECTOMY      WEDGE RESECTION LUNG         Family History   Problem Relation Age of Onset    Cancer Maternal Grandfather      Breast Cancer Paternal Grandmother     Stroke Paternal Grandfather        Social History     Socioeconomic History    Marital status: Single     Spouse name: Not on file    Number of children: Not on file    Years of education: Not on file    Highest education level: Not on file   Occupational History    Not on file   Tobacco Use    Smoking status: Never    Smokeless tobacco: Never   Vaping Use    Vaping Use: Never used   Substance and Sexual Activity    Alcohol use: Not Currently     Comment: Maybe have a drink or two a year    Drug use: No     Comment: gummies (occ)    Sexual activity: Not on file   Other Topics Concern    Not on file   Social History Narrative    Not on file     Social Determinants of Health     Financial Resource Strain: Not on file   Food Insecurity: Not on file   Transportation Needs: Not on file   Physical Activity: Not on file   Stress: Not on file   Social Connections: Not on file   Intimate Partner Violence: Not on file   Housing Stability: Not on file       Current Outpatient Medications   Medication Sig Dispense Refill    zolpidem (AMBIEN) 5 MG Tab Take 1-2 Tablets by mouth at bedtime as needed for Sleep (Take 1-2 tabs on the night of the sleep study as needed) for up to 1 day. Take 1-2 tabs on the night of the sleep study as needed 2 Tablet 0    predniSONE (DELTASONE) 5 MG Tab take 1 tablet by mouth once daily 30 Tablet 5    predniSONE (DELTASONE) 1 MG Tab take 3 tablet by mouth once daily 90 Tablet 5    predniSONE 2 MG Tablet Delayed Response Take 8 mg by mouth every day. 120 Tablet 3    FEROSUL 325 (65 Fe) MG tablet Take 325 mg by mouth 3 times a day with meals.      medroxyPROGESTERone (PROVERA) 10 MG Tab Take 20 mg by mouth 2 times a day.      benzonatate (TESSALON) 100 MG Cap Take 1 Capsule by mouth 2 times a day as needed for Cough. 60 Capsule 5    montelukast (SINGULAIR) 10 MG Tab take 1 tablet by mouth once daily 30 Tablet 11    SYMBICORT 160-4.5 MCG/ACT Aerosol Inhale 2 Puffs 2  "times a day.      omeprazole (PRILOSEC) 20 MG delayed-release capsule Take 1 capsule by mouth 2 times a day. 30 capsule 11    Ferrous Sulfate (IRON PO) Take  by mouth every 48 hours.      venlafaxine XR (EFFEXOR XR) 37.5 MG CAPSULE SR 24 HR Take 75 mg by mouth every day.      prednisONE (DELTASONE) 2.5 MG Tab Take 8 mg by mouth every day.      ibuprofen (MOTRIN) 800 MG Tab Take 800 mg by mouth 3 times a day as needed.      Cholecalciferol 82181 UNIT Cap 5,000 Units every day.      Hydrocod Polst-CPM Polst ER (TUSSIONEX) 10-8 MG/5ML Suspension Extended Release Take 5 mL by mouth every 12 hours as needed.      mycophenolate (CELLCEPT) 500 MG tablet Take 1,250 mg by mouth 2 times a day.      methocarbamol (ROBAXIN) 750 MG Tab Take 750 mg by mouth 3 times a day as needed.      HYDROcodone-acetaminophen (NORCO) 7.5-325 MG per tablet Take 2 Tabs by mouth every 6 hours as needed.      hydroCHLOROthiazide (HYDRODIURIL) 25 MG Tab Take 25 mg by mouth every day.      albuterol (VENTOLIN OR PROVENTIL) 108 (90 BASE) MCG/ACT AERS Inhale 2 Puffs by mouth every 6 hours as needed for Shortness of Breath. 1 Inhaler 0     No current facility-administered medications for this visit.    \"CURRENT RX\"    ALLERGIES: Patient has no known allergies.    ROS    Constitutional: Denies fever, chills, sweats,  weight loss, fatigue.  Eyes: Denies vision loss, pain, drainage, double vision, glasses.  Ears/Nose/Mouth/Throat: Denies earache, difficulty hearing, rhinitis/nasal congestion, injury, recurrent sore throat, persistent hoarseness, decayed teeth/toothaches, ringing or buzzing in the ears.  Cardiovascular: Denies chest pain, tightness, palpitations, swelling in legs/feet, fainting, difficulty breathing when lying down but gets better when sitting up.   Respiratory: Denies shortness of breath, cough, sputum, wheezing, painful breathing, coughing up blood.   Sleep: per HPI  Gastrointestinal: Denies  difficulty swallowing, nausea, abdominal " "pain, diarrhea, constipation, heartburn.  Genitourinary: Denies  blood in urine, discharge, frequent urination.   Musculoskeletal: Denies painful joints, sore muscles, back pain.   Integumentary: Denies rashes, lumps, color changes.   Neurological: Denies frequent headaches,weakness, dizziness.    PHYSICAL EXAM      /88 (BP Location: Left arm, Patient Position: Sitting, BP Cuff Size: Adult)   Pulse 86   Resp 16   Ht 1.727 m (5' 8\")   Wt (!) 144 kg (317 lb)   SpO2 93%   BMI 48.20 kg/m²   Appearance: Well-nourished, well-developed, no acute distress  Eyes:  PERRLA, EOMI  ENMT: masked  Neck: Supple, trachea midline, no masses  Respiratory effort:  No intercostal retractions or use of accessory muscles  Lung auscultation:  No wheezes rhonchi rubs or rales  Cardiac: No murmurs, rubs, or gallops; regular rhythm, normal rate; no edema  Abdomen:  No tenderness, no organomegaly  Musculoskeletal:  Grossly normal; gait and station normal; digits and nails normal  Skin:  No rashes, petechiae, cyanosis  Neurologic: without focal signs; oriented to person, time, place, and purpose; judgement intact  Psychiatric:  No depression, anxiety, agitation        Medical Decision Making:  The medical record was reviewed in its entirety including the referral notes, records from primary care, consultants notes, hospital records, labs, imaging, microbiology, immunology, and immunizations.  Care gaps identified and reviewed with the patient.    Diagnostic and titration nocturnal polysomnograms, home sleep apnea tests, continuous nocturnal oximetry results, multiple sleep latency tests, and compliance reports reviewed.        1. OLIVIA (obstructive sleep apnea)  - zolpidem (AMBIEN) 5 MG Tab; Take 1-2 Tablets by mouth at bedtime as needed for Sleep (Take 1-2 tabs on the night of the sleep study as needed) for up to 1 day. Take 1-2 tabs on the night of the sleep study as needed  Dispense: 2 Tablet; Refill: 0  - Polysomnography, 4 or " More; Future      PLAN:   Patient has signs and symptoms suggestive of sleep apnea.  She was previously diagnosed to have OLIVIA at [place] but stopped using her machine because it was recalled.  We will need to do repeat sleep study to reestablish the diagnosis and render appropriate therapy.    The risks of untreated sleep apnea were discussed with the patient at length. Patients with OLIVIA are at increased risk of cardiovascular disease including systemic arterial hypertension, pulmonary arterial hypertension (transient or fixed), TIA, and an elevated risk of stroke, heart attack, sudden death, or arrhythmia between the hours of 11 PM and 6 AM. OLIVIA patients have an increased risk of motor vehicle accidents, type 2 diabetes, GERD, repetitive mechanical trauma to pharyngeal muscles with inflammation and denervation, frequent EEG arousals leading to nonrestorative sleep, excessive daytime sleepiness, fatigue, depression, poor pain control, irritability, and lower quality of life.  The patient was advised to avoid driving a motor vehicle when drowsy.    Positive airway pressure will favorably impact many of the adverse conditions and effects provoked by OLIVIA.    Have advised the patient to follow up with the appropriate healthcare practitioners for all other medical problems and issues. Discussed blood pressure management if needed. Discussed depression screening.        Return in about 1 year (around 3/14/2024).    Total time 45 minutes

## 2023-03-14 ENCOUNTER — OFFICE VISIT (OUTPATIENT)
Dept: SLEEP MEDICINE | Facility: MEDICAL CENTER | Age: 43
End: 2023-03-14
Attending: INTERNAL MEDICINE
Payer: COMMERCIAL

## 2023-03-14 VITALS
SYSTOLIC BLOOD PRESSURE: 128 MMHG | RESPIRATION RATE: 16 BRPM | HEIGHT: 68 IN | OXYGEN SATURATION: 93 % | BODY MASS INDEX: 44.41 KG/M2 | DIASTOLIC BLOOD PRESSURE: 88 MMHG | WEIGHT: 293 LBS | HEART RATE: 86 BPM

## 2023-03-14 DIAGNOSIS — G47.33 OSA (OBSTRUCTIVE SLEEP APNEA): ICD-10-CM

## 2023-03-14 PROCEDURE — 99204 OFFICE O/P NEW MOD 45 MIN: CPT | Performed by: INTERNAL MEDICINE

## 2023-03-14 PROCEDURE — 99212 OFFICE O/P EST SF 10 MIN: CPT | Performed by: INTERNAL MEDICINE

## 2023-03-14 RX ORDER — ZOLPIDEM TARTRATE 5 MG/1
5-10 TABLET ORAL NIGHTLY PRN
Qty: 2 TABLET | Refills: 0 | Status: SHIPPED | OUTPATIENT
Start: 2023-03-14 | End: 2023-03-15

## 2023-04-02 ENCOUNTER — SLEEP STUDY (OUTPATIENT)
Dept: SLEEP MEDICINE | Facility: MEDICAL CENTER | Age: 43
End: 2023-04-02
Attending: INTERNAL MEDICINE
Payer: COMMERCIAL

## 2023-04-02 DIAGNOSIS — G47.33 OSA (OBSTRUCTIVE SLEEP APNEA): ICD-10-CM

## 2023-04-02 PROCEDURE — 95810 POLYSOM 6/> YRS 4/> PARAM: CPT | Performed by: INTERNAL MEDICINE

## 2023-04-03 NOTE — PROCEDURES
MONTAGE: Standard  STUDY TYPE: Diagnostic  RECORDING TECHNIQUE:   After the scalp was prepared, gold plated electrodes were applied to the scalp according to the International 10-20 System. EEG (electroencephalogram) was continuously monitored from the O1-M2, O2-M1, C3-M2, C4-M1, F3-M2, and F4-M1. EOGs (electrooculograms) were monitored by electrodes placed at the left and right outer canthi. Chin EMG (electromyogram) was monitored by electrodes placed on the mentalis and sub-mentalis muscles. Nasal and oral airflow were monitored using a triple port thermocouple as well as oronasal pressure transducer. Respiratory effort was measured by inductive plethysmography technology employing abdominal and thoracic belts. Blood oxygen saturation and pulse were monitored by pulse oximetry. Heart rhythm was monitored by surface electrocardiogram. Leg EMG was studied using surface electrodes placed on left and right anterior tibialis. A microphone was used to monitor tracheal sounds and snoring. Body position was monitored and documented by technician observation.   SCORING CRITERIA:   A modification of the AASM manual for scoring of sleep and associated events was used. Obstructive apneas were scored by cessation of airflow for at least 10 seconds with continuing respiratory effort. Central apneas were scored by cessation of airflow for at least 10 seconds with no respiratory effort. Hypopneas were scored by a 30% or more reduction in airflow for at least 10 seconds accompanied by arterial oxygen desaturation of 3% or an arousal. For CMS (Medicare) patients, per AASM rule 1B, hypopneas are scored by 30% with mild reduction in airflow for at least 10 seconds accompanied by arterial saturation decreased at 4%.  Study start time was 10:21:07 PM. Diagnostic recording time was 7h 31.0m with a total sleep time of 6h 58.5m resulting in a sleep efficiency of 92.79%%. Sleep latency from the start of the study was 03 minutes and the  latency from sleep to REM was 318 minutes. In total,58 arousals were scored for an arousal index of 8.3.  Respiratory:  There were a total of 0 apneas consisting of 0 obstructive apneas, 0 mixed apneas, and 0 central apneas. A total of 54 hypopneas were scored. The apnea index was 0.00 per hour and the hypopnea index was 7.74 per hour resulting in an overall AHI of 7.74. AHI during rem was 51.4 and AHI while supine was 18.69.  Oximetry:  There was a mean oxygen saturation of 93.0%. The minimum oxygen saturation in NREM was 83.0 % and in REM was 85.0. The patient spent 5.7 minutes of TST with SaO2 <88%.  Cardiac:  The highest heart rate seen while awake was 108 BPM while the highest heart rate during sleep was 109 BPM with an average sleeping heart rate of 83 BPM.  Limb Movements:  There were a total of 34 PLMs during sleep which resulted in a PLMS index of 4.9. Of these, 5 were associated with arousals which resulted in a PLMS arousal index of 0.7.        ASSESSMENT:    Mild positional obstructive sleep apnea hypopnea - overall AHI 7.7, supine AHI 18.7  Mild nocturnal desaturation - malcom saturation 83% - saturations less than or equal to 88% for 1.8% of the TST  Failure to meet split-night protocol secondary to an insufficient number of qualifying respiratory events before 2 AM        The AHI is the number of apneas (cessation of airflow for 10 seconds or longer) and hypopneas (shallow breaths accompanied by at least a 3% drop in the oxygen saturation) that occur per hour. Less than 5 per hour is normal, 5-15 per hour is mild, 15-30 per hour moderate, and greater than 30 per hour severe.        RECOMMENDATION:    If significant signs, symptoms, and or comorbidities warrant, recommend a positive airway pressure titration. Alternative treatments for OSAH include behavioral modification, use of a dental appliance, and nasopharyngeal reconstructive surgery. Behavior modification includes weight loss, eliminating  alcohol and sedatives, and avoiding the supine position. If alternative treatments are used, a follow-up diagnostic study is warranted to ensure efficacy.  Clinical correlation is needed.  An empiric trial of auto titrating CPAP may be an acceptable option.      The  Patients with sleep apnea are at increased risk of cardiovascular disease including systemic arterial hypertension, pulmonary arterial hypertension, (transient or fixed), transient ischemic attacks, and an elevated risk of stroke, heart attack, sudden death, or arrhythmia between the hours of 11 PM and 6 AM.  Sleep apnea patients have an increased risk of motor vehicle accidents, type 2 diabetes, gastroesophageal reflux, repetitive mechanical trauma to pharyngeal muscles with inflammation and denervation, frequent EEG arousals leading to nonrestorative sleep, excessive daytime somnolence, fatigue, depression, poor pain control, irritability, and a lower quality of life.                Dr. Jose Carlos Ybarra MD

## 2023-04-12 LAB — HBA1C MFR BLD: 6.3 % (ref ?–5.8)

## 2023-04-20 NOTE — PROGRESS NOTES
CC: Sleep study results        HPI:  Lara Alexander is a 42 y.o. 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 Malick Young for OLIVIA previously on CPAP and first seen 3/14/2023.  Original study was done at [facility].  Her machine was recalled and she stopped using her device necessitating a new study.       A sleep study performed on 4/2/2023 showed mild positional obstructive sleep apnea hypopnea with an overall AHI of 7.7, supine AHI of 18.7, and mild nocturnal desaturation with a malcom saturation of 83%.  Her saturations were less than 88% for only 1.8% of the TST.  She failed to meet the split-night protocol secondary to an insufficient number of qualifying respiratory events before 2 AM.    Options for PAP therapy include an empiric challenge with auto titrating CPAP versus an attended test.      Past medical history significant for obesity, chronic respiratory failure requiring domiciliary O2, status post HUBER/BSO for endometrial cancer 10/22, never smoker, interstitial lung disease status post lung biopsy showing UIP with fibroblastic foci and eosinophilia but also uniform cellular expansion of the interstitium suggestive of NSIP/OP, and ongoing prednisone and  mycophenolate..  She was referred to [place] and was evaluated by Dr. Ji Short MD and is seen Dr. Yang in [place] a rheumatologist.  Patient Active Problem List    Diagnosis Date Noted    Morbid obesity with BMI of 45.0-49.9, adult ([facility]) 09/08/2022    Chronic hypoxemic respiratory failure (HCC) 09/08/2022    Endometrial cancer (HCC) 09/08/2022    Preop pulmonary/respiratory exam 09/08/2022    Interstitial lung disease (HCC) 12/04/2020    OLIVIA on CPAP 12/04/2020    Class 3 severe obesity with serious comorbidity and body mass index (BMI) of 45.0 to 49.9 in adult ([facility]) 12/04/2020    Iron deficiency anemia due to chronic blood loss 12/04/2020       Past Medical History:   Diagnosis Date    Asthma     Back  pain     Back pain     Chest pain     Chest tightness     Chickenpox     Chills     Cough     Depression     Dizziness     Earache     Fatigue     Frequent headaches     Frequent urination     GERD (gastroesophageal reflux disease)     Heartburn     Hyperlipidemia     Hypertension     Hypothyroidism     Insomnia     Irregular periods     Morning headache     Nasal drainage     Nausea     Obesity     Painful joint     Pneumonia     Pulmonary nodule     Restless leg syndrome     Rhinitis     Shortness of breath     Sleep apnea     Sore muscles     Sputum production     Sweat, sweating, excessive     Swelling of lower extremity     Tonsillitis     Weakness     Wears glasses     Wheezing         Past Surgical History:   Procedure Laterality Date    BRONCHOSCOPY      HYSTERECTOMY LAPAROSCOPY      INTUBATION      LUNG BIOPSY OPEN      TONSILLECTOMY      WEDGE RESECTION LUNG         Family History   Problem Relation Age of Onset    Cancer Maternal Grandfather     Breast Cancer Paternal Grandmother     Stroke Paternal Grandfather        Social History     Socioeconomic History    Marital status: Single     Spouse name: Not on file    Number of children: Not on file    Years of education: Not on file    Highest education level: Not on file   Occupational History    Not on file   Tobacco Use    Smoking status: Never    Smokeless tobacco: Never   Vaping Use    Vaping Use: Never used   Substance and Sexual Activity    Alcohol use: Not Currently     Comment: Maybe have a drink or two a year    Drug use: No     Comment: gummies (occ)    Sexual activity: Not on file   Other Topics Concern    Not on file   Social History Narrative    Not on file     Social Determinants of Health     Financial Resource Strain: Not on file   Food Insecurity: Not on file   Transportation Needs: Not on file   Physical Activity: Not on file   Stress: Not on file   Social Connections: Not on file   Intimate Partner Violence: Not on file   Housing  Stability: Not on file       Current Outpatient Medications   Medication Sig Dispense Refill    predniSONE (DELTASONE) 5 MG Tab take 1 tablet by mouth once daily 30 Tablet 5    benzonatate (TESSALON) 100 MG Cap Take 1 Capsule by mouth 2 times a day as needed for Cough. 60 Capsule 5    montelukast (SINGULAIR) 10 MG Tab take 1 tablet by mouth once daily 30 Tablet 11    SYMBICORT 160-4.5 MCG/ACT Aerosol Inhale 2 Puffs 2 times a day.      omeprazole (PRILOSEC) 20 MG delayed-release capsule Take 1 capsule by mouth 2 times a day. 30 capsule 11    venlafaxine XR (EFFEXOR XR) 37.5 MG CAPSULE SR 24 HR Take 75 mg by mouth every day.      ibuprofen (MOTRIN) 800 MG Tab Take 800 mg by mouth 3 times a day as needed.      Cholecalciferol 58503 UNIT Cap 5,000 Units every day.      Hydrocod Polst-CPM Polst ER (TUSSIONEX) 10-8 MG/5ML Suspension Extended Release Take 5 mL by mouth every 12 hours as needed.      mycophenolate (CELLCEPT) 500 MG tablet Take 1,250 mg by mouth 2 times a day.      methocarbamol (ROBAXIN) 750 MG Tab Take 750 mg by mouth 3 times a day as needed.      HYDROcodone-acetaminophen (NORCO) 7.5-325 MG per tablet Take 2 Tabs by mouth every 6 hours as needed.      albuterol (VENTOLIN OR PROVENTIL) 108 (90 BASE) MCG/ACT AERS Inhale 2 Puffs by mouth every 6 hours as needed for Shortness of Breath. 1 Inhaler 0    predniSONE (DELTASONE) 1 MG Tab take 3 tablet by mouth once daily (Patient not taking: Reported on 4/24/2023) 90 Tablet 5    predniSONE 2 MG Tablet Delayed Response Take 8 mg by mouth every day. 120 Tablet 3    FEROSUL 325 (65 Fe) MG tablet Take 325 mg by mouth 3 times a day with meals.      medroxyPROGESTERone (PROVERA) 10 MG Tab Take 20 mg by mouth 2 times a day.      Ferrous Sulfate (IRON PO) Take  by mouth every 48 hours.      prednisONE (DELTASONE) 2.5 MG Tab Take 8 mg by mouth every day.      hydroCHLOROthiazide (HYDRODIURIL) 25 MG Tab Take 25 mg by mouth every day.       No current facility-administered  "medications for this visit.    \"CURRENT RX\"    ALLERGIES: Patient has no known allergies.    ROS      Constitutional: Denies fever, chills, sweats,  weight loss, fatigue  Cardiovascular: Denies chest pain, tightness, palpitations, swelling in legs/feet, fainting, difficulty breathing when lying down but gets better when sitting up.   Respiratory: Denies shortness of breath, cough, sputum, wheezing, painful breathing, coughing up blood.   Sleep: per HPI  Gastrointestinal: Denies  difficulty swallowing, nausea, abdominal pain, diarrhea, constipation, heartburn.  Musculoskeletal: Denies painful joints, sore muscles, back pain.        PHYSICAL EXAM        /84 (BP Location: Right arm, Patient Position: Sitting, BP Cuff Size: Adult)   Pulse 76   Resp 16   Ht 1.727 m (5' 8\")   Wt (!) 146 kg (322 lb)   SpO2 94%   BMI 48.96 kg/m²   Appearance: Well-nourished, well-developed, no acute distress  Eyes:  PERRLA, EOMI  ENMT: masked  Neck: Supple, trachea midline, no masses  Respiratory effort:  No intercostal retractions or use of accessory muscles  Lung auscultation:  No wheezes rhonchi rubs or rales  Cardiac: No murmurs, rubs, or gallops; regular rhythm, normal rate; no edema  Abdomen:  No tenderness, no organomegaly.  Musculoskeletal:  Grossly normal; gait and station normal; digits and nails normal  Skin:  No rashes, petechiae, cyanosis  Neurologic: without focal signs; oriented to person, time, place, and purpose; judgement intact  Psychiatric:  No depression, anxiety, agitation      Medical Decision Making       The medical record was reviewed in its entirety including the referral notes, records from primary care, consultants notes, hospital records, lab, imaging, microbiology, immunology, and immunizations.  Care gaps identified and reviewed with the patient.    Diagnostic and titration nocturnal polysomnogram's, home sleep apnea tests, continuous nocturnal oximetry results, multiple sleep latency tests, and " compliance reports reviewed.  1. OLIVIA (obstructive sleep apnea)  - DME CPAP      PLAN:      A sleep study performed on 4/2/2023 showed mild positional obstructive sleep apnea hypopnea with an overall AHI of 7.7, supine AHI of 18.7, and mild nocturnal desaturation with a malcom saturation of 83%.  Her saturations were less than 88% for only 1.8% of the TST.  She failed to meet the split-night protocol secondary to an insufficient number of qualifying respiratory events before 2 AM.    Options for PAP therapy include an empiric challenge with auto titrating CPAP versus an attended test.    Have ordered apap 5-20 cwp and 2 lpm O2.    The risks of untreated sleep apnea were discussed with the patient at length. Patients with OLIVIA are at increased risk of cardiovascular disease including systemic arterial hypertension, pulmonary arterial hypertension (transient or fixed), TIA, and an elevated risk of stroke, heart attack, sudden death, or arrhythmia between the hours of 11 PM and 6 AM. OLIVIA patients have an increased risk of motor vehicle accidents, type 2 diabetes, GERD, repetitive mechanical trauma to pharyngeal muscles with inflammation and denervation, frequent EEG arousals leading to nonrestorative sleep, excessive daytime sleepiness, fatigue, depression, poor pain control, irritability, and lower quality of life.  The patient was advised to avoid driving a motor vehicle when drowsy.    Positive airway pressure will favorably impact many of the adverse conditions and effects provoked by OLIVIA.    Have advised the patient to follow up with the appropriate healthcare practitioners for all other medical problems and issues.    Return in about 10 weeks (around 7/3/2023).    Total time 30 minutes

## 2023-04-24 ENCOUNTER — OFFICE VISIT (OUTPATIENT)
Dept: SLEEP MEDICINE | Facility: MEDICAL CENTER | Age: 43
End: 2023-04-24
Attending: INTERNAL MEDICINE
Payer: COMMERCIAL

## 2023-04-24 VITALS
BODY MASS INDEX: 44.41 KG/M2 | WEIGHT: 293 LBS | HEIGHT: 68 IN | OXYGEN SATURATION: 94 % | SYSTOLIC BLOOD PRESSURE: 128 MMHG | RESPIRATION RATE: 16 BRPM | HEART RATE: 76 BPM | DIASTOLIC BLOOD PRESSURE: 84 MMHG

## 2023-04-24 DIAGNOSIS — G47.33 OSA (OBSTRUCTIVE SLEEP APNEA): ICD-10-CM

## 2023-04-24 PROCEDURE — 99214 OFFICE O/P EST MOD 30 MIN: CPT | Performed by: INTERNAL MEDICINE

## 2023-04-24 PROCEDURE — 99212 OFFICE O/P EST SF 10 MIN: CPT | Performed by: INTERNAL MEDICINE

## 2023-07-23 DIAGNOSIS — J84.9 INTERSTITIAL LUNG DISEASE (HCC): ICD-10-CM

## 2023-07-25 NOTE — TELEPHONE ENCOUNTER
Caller Name: Lara Alexander                 Call Back Number: [phone] (home) [phone] (work)        Patient approves a detailed voicemail message: N\A    Have we ever prescribed this med? Yes.  If yes, what date? 7/18/22    Last OV: 9/8/22 with Dr. Young     Next OV: N/A     DX: Interstitial lung disease     Medications:MONTELUKAST SOD 10 MG TABLET

## 2023-07-26 RX ORDER — MONTELUKAST SODIUM 10 MG/1
TABLET ORAL
Qty: 30 TABLET | Refills: 0 | Status: SHIPPED | OUTPATIENT
Start: 2023-07-26 | End: 2023-08-28

## 2023-08-21 ENCOUNTER — APPOINTMENT (OUTPATIENT)
Dept: CARDIOLOGY | Facility: MEDICAL CENTER | Age: 43
End: 2023-08-21
Attending: INTERNAL MEDICINE
Payer: COMMERCIAL

## 2023-08-23 ENCOUNTER — APPOINTMENT (OUTPATIENT)
Dept: CARDIOLOGY | Facility: MEDICAL CENTER | Age: 43
End: 2023-08-23
Attending: INTERNAL MEDICINE
Payer: COMMERCIAL

## 2023-08-28 DIAGNOSIS — J84.9 INTERSTITIAL LUNG DISEASE (HCC): ICD-10-CM

## 2023-08-28 RX ORDER — MONTELUKAST SODIUM 10 MG/1
TABLET ORAL
Qty: 30 TABLET | Refills: 11 | Status: SHIPPED | OUTPATIENT
Start: 2023-08-28

## 2023-08-28 NOTE — TELEPHONE ENCOUNTER
Have we ever prescribed this med? Yes.  If yes, what date? 07/26/23    Last OV: 12/14/22 with Dr Young    Next OV: No Pending appt.     DX: ILD    Medications:   Requested Prescriptions     Pending Prescriptions Disp Refills    montelukast (SINGULAIR) 10 MG Tab [Pharmacy Med Name: MONTELUKAST SOD 10 MG TABLET] 30 Tablet 0     Sig: take 1 tablet by mouth once daily

## 2023-08-29 ENCOUNTER — PATIENT MESSAGE (OUTPATIENT)
Dept: SLEEP MEDICINE | Facility: MEDICAL CENTER | Age: 43
End: 2023-08-29
Payer: COMMERCIAL

## 2023-08-29 DIAGNOSIS — J84.9 INTERSTITIAL LUNG DISEASE (HCC): ICD-10-CM

## 2023-09-11 NOTE — PROGRESS NOTES
Orders Placed This Encounter    Exercise Test for Bronchospasm / 6-Minute Walk    PULMONARY FUNCTION TESTS -Test requested: Complete Pulmonary Function Test

## 2023-09-25 ENCOUNTER — ANCILLARY PROCEDURE (OUTPATIENT)
Dept: CARDIOLOGY | Facility: MEDICAL CENTER | Age: 43
End: 2023-09-25
Attending: INTERNAL MEDICINE
Payer: COMMERCIAL

## 2023-09-25 DIAGNOSIS — R06.02 SHORTNESS OF BREATH: ICD-10-CM

## 2023-09-25 LAB — LV EJECT FRACT  99904: 55

## 2023-09-25 PROCEDURE — 93306 TTE W/DOPPLER COMPLETE: CPT

## 2023-09-25 PROCEDURE — 93306 TTE W/DOPPLER COMPLETE: CPT | Mod: 26 | Performed by: INTERNAL MEDICINE

## 2023-10-05 ENCOUNTER — NON-PROVIDER VISIT (OUTPATIENT)
Dept: SLEEP MEDICINE | Facility: MEDICAL CENTER | Age: 43
End: 2023-10-05
Attending: INTERNAL MEDICINE
Payer: COMMERCIAL

## 2023-10-05 VITALS — BODY MASS INDEX: 44.41 KG/M2 | WEIGHT: 293 LBS | HEIGHT: 68 IN

## 2023-10-05 VITALS — WEIGHT: 293 LBS | BODY MASS INDEX: 49.11 KG/M2

## 2023-10-05 DIAGNOSIS — J84.9 INTERSTITIAL LUNG DISEASE (HCC): ICD-10-CM

## 2023-10-05 DIAGNOSIS — R06.02 SOB (SHORTNESS OF BREATH): ICD-10-CM

## 2023-10-05 PROCEDURE — 94726 PLETHYSMOGRAPHY LUNG VOLUMES: CPT | Mod: 26 | Performed by: INTERNAL MEDICINE

## 2023-10-05 PROCEDURE — 94729 DIFFUSING CAPACITY: CPT | Mod: 26 | Performed by: INTERNAL MEDICINE

## 2023-10-05 PROCEDURE — 94729 DIFFUSING CAPACITY: CPT | Performed by: INTERNAL MEDICINE

## 2023-10-05 PROCEDURE — 94726 PLETHYSMOGRAPHY LUNG VOLUMES: CPT | Performed by: INTERNAL MEDICINE

## 2023-10-05 PROCEDURE — 94618 PULMONARY STRESS TESTING: CPT | Mod: 26 | Performed by: INTERNAL MEDICINE

## 2023-10-05 PROCEDURE — 94618 PULMONARY STRESS TESTING: CPT | Performed by: INTERNAL MEDICINE

## 2023-10-05 PROCEDURE — 94060 EVALUATION OF WHEEZING: CPT | Mod: 26 | Performed by: INTERNAL MEDICINE

## 2023-10-05 PROCEDURE — 94060 EVALUATION OF WHEEZING: CPT | Performed by: INTERNAL MEDICINE

## 2023-10-05 ASSESSMENT — 6 MINUTE WALK TEST (6MWT)
PERCEIVED BREATHLESSNESS AT 2 MIN: 0
AMBULATES WITH O2: WITHOUT O2
PERCEIVED BREATHLESSNESS AT 3 MIN: 2
PERCEIVED FATIGUE AT 1 MIN: 0
PERCEIVED FATIGUE AT 3 MIN: 0
PERCEIVED FATIGUE AT 4 MIN: 0
PERCEIVED BREATHLESSNESS AT 5 MIN: 2
HEART RATE AT 3 MIN: 138
PERCEIVED FATIGUE AT 5 MIN: 0
BLOOD PRESSURE: LEFT ARM
PERCEIVED FATIGUE AT 6 MIN: 0
PERCEIVED BREATHLESSNESS AT 1 MIN: 0
SAO2 AT 6 MIN: 89
SAO2 AT 1 MIN: 92
SAO2 AT 2 MIN: 97
PERCEIVED BREATHLESSNESS AT 4 MIN: 2
BLOOD PRESSURE AT 1 MIN: 158/80
PERCEIVED BREATHLESSNESS AT 1 MIN: 2
SAO2 AT 2 MIN: 92
PERCEIVED BREATHLESSNESS AT 2 MIN: 0.5
PERCEIVED FATIGUE AT 1 MIN: 0
SAO2 AT 4 MIN: 91
HEART RATE AT 5 MIN: 140
PERCENT OF NORMAL WALKED: 81
SAO2 AT 3 MIN: 92
SITTING BLOOD PRESSURE: 122/72
PERCEIVED FATIGUE AT 2 MIN: 0
HEART RATE AT 2 MIN: 132
HEART RATE AT 6 MIN: 148
HEART RATE AT 2 MIN: 97
HEART RATE AT 1 MIN: 120
HEART RATE AT 4 MIN: 139
PERCEIVED FATIGUE AT 2 MIN: 0
PERCEIVED BREATHLESSNESS AT 6 MIN: 2
O2 SAT PERCENT ROOM AIR: 97
NUMBER OF RESTS: 0
SAO2 AT 5 MIN: 90
HEART RATE: 98
HEART RATE AT 1 MIN: 130
SAO2 AT 1 MIN: 95

## 2023-10-05 ASSESSMENT — PULMONARY FUNCTION TESTS
FEV1_PREDICTED: 3.36
FEV1_PERCENT_PREDICTED: 65
FEV1/FVC_PERCENT_CHANGE: 14
FEV1_PERCENT_CHANGE: 9
FEV1: 1.7
FVC: 3.24
FEV1/FVC: 68
FEV1/FVC: 68.52
FVC_LLN: 3.48
FEV1: 2.22
FEV1/FVC_PERCENT_PREDICTED: 73
FEV1_PERCENT_PREDICTED: 50
FEV1_PERCENT_CHANGE: 15
FVC_PREDICTED: 4.17
FEV1/FVC: 60
FEV1_LLN: 2.81
FEV1/FVC_PERCENT_PREDICTED: 84
FVC_PERCENT_PREDICTED: 67
FEV1/FVC_PREDICTED: 81
FVC: 2.98
FEV1/FVC_PERCENT_LLN: 68
FEV1/FVC_PERCENT_CHANGE: 167
FVC_PERCENT_PREDICTED: 77
FEV1/FVC_PERCENT_PREDICTED: 81
FEV1/FVC_PERCENT_PREDICTED: 75
FEV1/FVC: 57
FEV1/FVC_PERCENT_PREDICTED: 84

## 2023-10-05 NOTE — PROCEDURES
Patient walked 1200 ft, 81% predicted for age. She required no oxygen for ambulation, malcom saturation was 89% at 6 min.     Magali Whitaker, DO   Pulmonary and Critical Care

## 2023-10-05 NOTE — PROCEDURES
Tech: Ligia Beaver, RT  Good patient effort & cooperation.  Test was performed on the [employer] Body Plethysmograph- Elite DX system.  The predicted sets used for Spirometry are GLI-2012, for Lung Volumes are ITS, and for DLCO is GLI 2017.  The results of this test meet the ATS standards for acceptability and repeatability.  The DLCO was uncorrected for Hb.  A bronchodilator of Ventolin HFA 2 puffs via spacer was administered.  DLCO was performed during dilation period.    Interpretation  1. There is mild obstruction  2. There is a significant bronchodilator response  3. Lung volumes are normal   4. DLCO is normal   5. Flow volume loop is consistent with obstruction    Mild obstruction wtih positive bronchodilator response      Magali Whitaker, DO   Pulmonary and Critical Care

## 2023-11-29 RX ORDER — PREDNISONE 5 MG/1
TABLET ORAL
Qty: 30 TABLET | Refills: 1 | Status: SHIPPED | OUTPATIENT
Start: 2023-11-29

## 2023-11-29 NOTE — TELEPHONE ENCOUNTER
Have we ever prescribed this med? Yes.  If yes, what date? 02/06/23    Last OV: 12/14/22 with Dr Young    Next OV: No Pending appt.     DX:     Medications:   Requested Prescriptions     Pending Prescriptions Disp Refills    predniSONE (DELTASONE) 5 MG Tab [Pharmacy Med Name: PREDNISONE 5 MG TABLET] 30 Tablet 5     Sig: take 1 tablet by mouth once daily

## 2023-12-01 NOTE — TELEPHONE ENCOUNTER
Magali Whitaker D.O.   to Me   MILAGROS    11/29/23 12:16 PM  I will refill this for two months but can we get her in to the clinic?           Called pt and lm, notifying pt of this. Provided our scheduling phone number on pt's vm.

## 2025-03-27 NOTE — PROCEDURES
Tech: Liliana Payne, RRT, CPFT  Tech notes: Good patient effort & cooperation.  FVC induced bronchospasm.  The results of this test meet the ATS/ERS standards for acceptability & reproducibility.  Test was performed on the [employer] Body Plethysmograph-Elite DX system.  Predicted values were GLI-2012 for spirometry, GLI- 2017 for DLCO, ITS for Lung Volumes.  The DLCO was uncorrected for Hgb.  A bronchodilator of Ventolin HFA -2puffs via spacer administered.  DLCO performed during dilation period.    Interpretation:  Moderately severe obstructive ventilatory defect, FEV1 2.25 L or 65% predicted.    Significant bronchodilator response.  Normal lung volumes and gas transfer.    
Detail Level: Detailed